# Patient Record
Sex: FEMALE | Race: WHITE | NOT HISPANIC OR LATINO | Employment: FULL TIME | URBAN - METROPOLITAN AREA
[De-identification: names, ages, dates, MRNs, and addresses within clinical notes are randomized per-mention and may not be internally consistent; named-entity substitution may affect disease eponyms.]

---

## 2017-06-08 ENCOUNTER — OFFICE VISIT (OUTPATIENT)
Dept: OBGYN | Facility: CLINIC | Age: 52
End: 2017-06-08
Payer: COMMERCIAL

## 2017-06-08 VITALS
DIASTOLIC BLOOD PRESSURE: 80 MMHG | BODY MASS INDEX: 29.25 KG/M2 | SYSTOLIC BLOOD PRESSURE: 122 MMHG | WEIGHT: 182 LBS | HEIGHT: 66 IN

## 2017-06-08 DIAGNOSIS — Z01.419 ENCOUNTER FOR GYNECOLOGICAL EXAMINATION WITHOUT ABNORMAL FINDING: Primary | ICD-10-CM

## 2017-06-08 DIAGNOSIS — N95.1 SYMPTOMATIC MENOPAUSAL OR FEMALE CLIMACTERIC STATES: ICD-10-CM

## 2017-06-08 PROCEDURE — 99396 PREV VISIT EST AGE 40-64: CPT | Performed by: NURSE PRACTITIONER

## 2017-06-08 PROCEDURE — 87624 HPV HI-RISK TYP POOLED RSLT: CPT | Performed by: NURSE PRACTITIONER

## 2017-06-08 PROCEDURE — G0145 SCR C/V CYTO,THINLAYER,RESCR: HCPCS | Performed by: NURSE PRACTITIONER

## 2017-06-08 RX ORDER — NORETHINDRONE ACETATE AND ETHINYL ESTRADIOL .5; 2.5 MG/1; UG/1
1 TABLET ORAL DAILY
Qty: 84 TABLET | Refills: 3 | Status: SHIPPED | OUTPATIENT
Start: 2017-06-08 | End: 2017-08-29 | Stop reason: ALTCHOICE

## 2017-06-08 ASSESSMENT — PATIENT HEALTH QUESTIONNAIRE - PHQ9: 5. POOR APPETITE OR OVEREATING: NEARLY EVERY DAY

## 2017-06-08 ASSESSMENT — ANXIETY QUESTIONNAIRES
IF YOU CHECKED OFF ANY PROBLEMS ON THIS QUESTIONNAIRE, HOW DIFFICULT HAVE THESE PROBLEMS MADE IT FOR YOU TO DO YOUR WORK, TAKE CARE OF THINGS AT HOME, OR GET ALONG WITH OTHER PEOPLE: SOMEWHAT DIFFICULT
6. BECOMING EASILY ANNOYED OR IRRITABLE: NEARLY EVERY DAY
GAD7 TOTAL SCORE: 12
3. WORRYING TOO MUCH ABOUT DIFFERENT THINGS: NOT AT ALL
1. FEELING NERVOUS, ANXIOUS, OR ON EDGE: NEARLY EVERY DAY
2. NOT BEING ABLE TO STOP OR CONTROL WORRYING: NOT AT ALL
7. FEELING AFRAID AS IF SOMETHING AWFUL MIGHT HAPPEN: NEARLY EVERY DAY
5. BEING SO RESTLESS THAT IT IS HARD TO SIT STILL: NOT AT ALL

## 2017-06-08 NOTE — MR AVS SNAPSHOT
"              After Visit Summary   6/8/2017    Linda Mina    MRN: [id number]           Patient Information     Date Of Birth          1965        Visit Information        Provider Department      6/8/2017 2:00 PM Julieta Moran APRN CNP [facility] [place]        Today's Diagnoses     Encounter for gynecological examination without abnormal finding    -  1    Symptomatic menopausal or female climacteric states           Follow-ups after your visit        Who to contact     If you have questions or need follow up information about today's clinic visit or your schedule please contact [facility] directly at [phone].  Normal or non-critical lab and imaging results will be communicated to you by ABBhart, letter or phone within 4 business days after the clinic has received the results. If you do not hear from us within 7 days, please contact the clinic through ABBhart or phone. If you have a critical or abnormal lab result, we will notify you by phone as soon as possible.  Submit refill requests through Curetis or call your pharmacy and they will forward the refill request to us. Please allow 3 business days for your refill to be completed.          Additional Information About Your Visit        MyChart Information     Curetis gives you secure access to your electronic health record. If you see a primary care provider, you can also send messages to your care team and make appointments. If you have questions, please call your primary care clinic.  If you do not have a primary care provider, please call [phone] and they will assist you.        Care EveryWhere ID     This is your Care EveryWhere ID. This could be used by other organizations to access your [facility] medical records  [id number]        Your Vitals Were     Height BMI (Body Mass Index)                5' 6\" (1.676 m) 29.38 kg/m2           Blood Pressure from Last 3 Encounters:   06/08/17 122/80 "   07/22/16 120/84   10/21/15 125/86    Weight from Last 3 Encounters:   06/08/17 182 lb (82.6 kg)   07/22/16 180 lb (81.6 kg)   10/21/15 200 lb 4.8 oz (90.9 kg)              We Performed the Following     HPV High Risk Types DNA Cervical     Pap imaged thin layer screen with HPV - recommended age 30 - 65          Today's Medication Changes          These changes are accurate as of: 6/8/17  2:40 PM.  If you have any questions, ask your nurse or doctor.               Start taking these medicines.        Dose/Directions    norethindrone-eth estradiol 0.5-2.5 MG-MCG Tabs   Used for:  Symptomatic menopausal or female climacteric states   Started by:  Julieta Moran APRN CNP        Dose:  1 tablet   Take 1 tablet by mouth daily   Quantity:  84 tablet   Refills:  3         Stop taking these medicines if you haven't already. Please contact your care team if you have questions.     docusate sodium 100 MG tablet   Commonly known as:  COLACE   Stopped by:  Julieta Moran APRN CNP           ketoconazole 2 % shampoo   Commonly known as:  NIZORAL   Stopped by:  Julieta Moran APRN CNP                Where to get your medicines      These medications were sent to [id number] - [place], [place] - [address] AT [facility] [place] & [facility] [address]  [address], [place] [address]    Hours:  24-hours Phone:  [phone]     norethindrone-eth estradiol 0.5-2.5 MG-MCG Tabs                Primary Care Provider Office Phone # Fax #    Dano Stuart [phone]       [facility] [address]  [facility] [address]        Thank you!     Thank you for choosing [facility]  for your care. Our goal is always to provide you with excellent care. Hearing back from our patients is one way we can continue to improve our services. Please take a few minutes to complete the written survey that you may receive in the mail after your visit with us. Thank  you!             Your Updated Medication List - Protect others around you: Learn how to safely use, store and throw away your medicines at www.disposemymeds.org.          This list is accurate as of: 6/8/17  2:40 PM.  Always use your most recent med list.                   Brand Name Dispense Instructions for use    ALLEGRA ALLERGY PO      Take 180 mg by mouth       ALPRAZolam 0.5 MG tablet    XANAX     Take 0.5 mg by mouth daily       fluconazole 150 MG tablet    DIFLUCAN    2 tablet    Take 1 tablet (150 mg) by mouth every 3 days       LEVOTHYROXINE SODIUM PO      Take 112 mcg by mouth daily       LUTEIN PO      Take 1 tablet by mouth daily       mometasone 50 MCG/ACT spray    NASONEX     Spray 2 sprays into both nostrils daily       Multi-vitamin Tabs tablet      Take 1 tablet by mouth daily       norethindrone-eth estradiol 0.5-2.5 MG-MCG Tabs     84 tablet    Take 1 tablet by mouth daily       * PATADAY 0.2 % Soln   Generic drug:  olopatadine HCl      1 drop as needed       * PAZEO 0.7 % Soln   Generic drug:  Olopatadine HCl      1 drop as needed       VITAMIN D3 PO      Take 2,000 Units by mouth daily       VITAMIN E NATURAL PO      Take by mouth daily       * Notice:  This list has 2 medication(s) that are the same as other medications prescribed for you. Read the directions carefully, and ask your doctor or other care provider to review them with you.

## 2017-06-08 NOTE — PROGRESS NOTES
Linda is a 51 year old No obstetric history on file. female who presents for annual exam.     Besides routine health maintenance,  she would like to discuss hot flashes.    HPI: annual exam discuss menopausal symptons.  Having hot flashes, night sweats, not sleeping, forgetful at times, and mood swings. Ablation 5 years ago, no cycle since.  The patient's PCP is  Dano Stuart MD.        GYNECOLOGIC HISTORY:    No LMP recorded. Patient has had an ablation.  Her current contraception method is: ablation.  She  reports that she quit smoking about 15 years ago. Her smoking use included Cigarettes. She has never used smokeless tobacco.    Patient is sexually active.  STD testing offered?  Declined  Last PHQ-9 score on record =   PHQ-9 SCORE 6/8/2017   Total Score 8     Last GAD7 score on record =   MARIANA-7 SCORE 6/8/2017   Total Score 12     Alcohol Score = 3    HEALTH MAINTENANCE:  Cholesterol: checked at work and PCP  Last Mammo: 2016 normal at [facility], Next Mammo: 2017  Pap: 7/22/16, Nil  Lab Results   Component Value Date    PAP NIL 07/22/2016    Colonoscopy:  8/17/15, Result: polyps, tubular adenomas, Next Colonoscopy: 2020.  Dexa:  7/22/16 T 0.9    Health maintenance updated:  yes    HISTORY:  Obstetric History     No data available          Patient Active Problem List   Diagnosis     Status post bilateral knee replacements     Past Surgical History:   Procedure Laterality Date     ARTHROPLASTY KNEE UNICOMPARTMENT  11/11/2013    Procedure: ARTHROPLASTY KNEE UNICOMPARTMENT;  BILATERAL UNICOMPARTMENTAL  KNEE ARTHROPLASTY (NANDA)^ ;  Surgeon: Jimbo Brown MD;  Location:  OR     ARTHROSCOPY SHOULDER DECOMPRESSION      right     BUNIONECTOMY       CARPAL TUNNEL RELEASE RT/LT       COLONOSCOPY N/A 8/17/2015    Procedure: COMBINED COLONOSCOPY, SINGLE OR MULTIPLE BIOPSY/POLYPECTOMY BY BIOPSY;  Surgeon: Dinorah Odom MD;  Location:  GI     LAPAROSCOPIC APPENDECTOMY N/A 10/21/2015    Procedure:  "LAPAROSCOPIC APPENDECTOMY;  Surgeon: Dinorah Odom MD;  Location: [facility]     MAMMOPLASTY AUGMENTATION        Social History   Substance Use Topics     Smoking status: Former Smoker     Types: Cigarettes     Quit date: 11/9/2001     Smokeless tobacco: Never Used     Alcohol use Yes      Comment: 2 glasses of wine 3 times a week      Problem (# of Occurrences) Relation (Name,Age of Onset)    Other Cancer (1) Father            Current Outpatient Prescriptions   Medication Sig     norethindrone-eth estradiol 0.5-2.5 MG-MCG TABS Take 1 tablet by mouth daily     Olopatadine HCl (PAZEO) 0.7 % SOLN 1 drop as needed     LUTEIN PO Take 1 tablet by mouth daily     VITAMIN E NATURAL PO Take by mouth daily      Cholecalciferol (VITAMIN D3 PO) Take 2,000 Units by mouth daily      multivitamin, therapeutic with minerals (MULTI-VITAMIN) TABS Take 1 tablet by mouth daily     mometasone (NASONEX) 50 MCG/ACT nasal spray Spray 2 sprays into both nostrils daily     LEVOTHYROXINE SODIUM PO Take 112 mcg by mouth daily     Fexofenadine HCl (ALLEGRA ALLERGY PO) Take 180 mg by mouth     ALPRAZolam (XANAX) 0.5 MG tablet Take 0.5 mg by mouth daily     olopatadine HCl (PATADAY) 0.2 % SOLN 1 drop as needed     fluconazole (DIFLUCAN) 150 MG tablet Take 1 tablet (150 mg) by mouth every 3 days (Patient not taking: Reported on 6/8/2017)     No current facility-administered medications for this visit.      No Known Allergies    Past medical, surgical, social and family histories were reviewed and updated in EPIC.    ROS:   12 point review of systems negative other than symptoms noted below.  Genitourinary: Hot Flashes  Endocrine: Decreased Libido  Psychiatric: Anxiety    EXAM:  /80  Ht 5' 6\" (1.676 m)  Wt 182 lb (82.6 kg)  BMI 29.38 kg/m2   BMI: Body mass index is 29.38 kg/(m^2).    PHYSICAL EXAM:  Constitutional:  Appearance: Well nourished, well developed, alert, in no acute distress  Neck:  Lymph Nodes:  No lymphadenopathy " present    Thyroid:  Gland size normal, nontender, no nodules or masses present  on palpation  Chest:  Respiratory Effort:  Breathing unlabored  Cardiovascular:    Heart: Auscultation:  Regular rate, normal rhythm, no murmurs present  Breasts: Inspection of Breasts:  No lymphadenopathy present    Palpation of Breasts and Axillae:  No masses present on palpation, no  breast tenderness    Axillary Lymph Nodes:  No lymphadenopathy present  Gastrointestinal:   Abdominal Examination:  Abdomen nontender to palpation, tone normal without rigidity or guarding, no masses present, umbilicus without lesions   Liver and Spleen:  No hepatomegaly present, liver nontender to palpation    Hernias:  No hernias present  Lymphatic: Lymph Nodes:  No other lymphadenopathy present  Skin:  General Inspection:  No rashes present, no lesions present, no areas of  discoloration    Genitalia and Groin:  No rashes present, no lesions present, no areas of  discoloration, no masses present  Neurologic/Psychiatric:    Mental Status:  Oriented X3     Pelvic Exam:  External Genitalia:     Normal appearance for age, no discharge present, no tenderness present, no inflammatory lesions present, color normal  Vagina:     Normal vaginal vault without central or paravaginal defects, no discharge present, no inflammatory lesions present, no masses present  Bladder:     Nontender to palpation  Urethra:   Urethral Body:  Urethra palpation normal, urethra structural support normal   Urethral Meatus:  No erythema or lesions present  Cervix:     Appearance healthy, no lesions present, nontender to palpation, no bleeding present  Uterus:     Uterus: firm, normal sized and nontender, midplane in position.   Adnexa:     No adnexal tenderness present, no adnexal masses present  Perineum:     Perineum within normal limits, no evidence of trauma, no rashes or skin lesions present  Anus:     Anus within normal limits, no hemorrhoids present  Inguinal Lymph Nodes:      No lymphadenopathy present  Pubic Hair:     Normal pubic hair distribution for age  Genitalia and Groin:     No rashes present, no lesions present, no areas of discoloration, no masses present    COUNSELING:   Reviewed preventive health counseling, as reflected in patient instructions       Regular exercise       Vision screening       Colon cancer screening       (Kimmie)menopause management    BMI: Body mass index is 29.38 kg/(m^2).      ASSESSMENT:  51 year old female with satisfactory annual exam.    ICD-10-CM    1. Encounter for gynecological examination without abnormal finding Z01.419 Pap imaged thin layer screen with HPV - recommended age 30 - 65     HPV High Risk Types DNA Cervical   2. Symptomatic menopausal or female climacteric states N95.1 norethindrone-eth estradiol 0.5-2.5 MG-MCG TABS       PLAN:  Normal Gyn exam.  Discussed risks methods and benefits of HRT and patient would like to try.  Will start femhrt and call with update.  Return prn or 1 year for annual and pap.    CARLOS Escalante CNP

## 2017-06-09 ASSESSMENT — PATIENT HEALTH QUESTIONNAIRE - PHQ9: SUM OF ALL RESPONSES TO PHQ QUESTIONS 1-9: 8

## 2017-06-09 ASSESSMENT — ANXIETY QUESTIONNAIRES: GAD7 TOTAL SCORE: 12

## 2017-06-12 LAB
COPATH REPORT: NORMAL
PAP: NORMAL

## 2017-06-13 LAB
FINAL DIAGNOSIS: NORMAL
HPV HR 12 DNA CVX QL NAA+PROBE: NEGATIVE
HPV16 DNA SPEC QL NAA+PROBE: NEGATIVE
HPV18 DNA SPEC QL NAA+PROBE: NEGATIVE
SPECIMEN DESCRIPTION: NORMAL

## 2017-08-18 ENCOUNTER — TELEPHONE (OUTPATIENT)
Dept: OBGYN | Facility: CLINIC | Age: 52
End: 2017-08-18

## 2017-08-18 NOTE — TELEPHONE ENCOUNTER
rec'd notice that her jevantique lo brand is not covered and cannot get generic in stock. I called patient and she had recently picked up her prescription as she had them special order it.     Bassam wants you to know the HRT has helped her symptoms tremendously-she is sleeping and her hot flashes are almost gone. She is getting a HA pretty much everyday. She said it is worth it to stay on the HRT, but if you had any other ideas to reach out to her.

## 2017-08-22 NOTE — TELEPHONE ENCOUNTER
Patient is doing well as far as menopausal sympton relief on femhrt, but getting headaches everyday.  They are not severe but occasionally takes advil for one.  Discussed she takes it in the evening with other pills, so going to try to  Take in AM alone and see if makes a difference.  Denies any visual problems with headaches.  If headaches persist after changing to am, will need to switch medication.  Patient will call with blaire.  GAIL Coppola

## 2017-08-29 DIAGNOSIS — N95.1 SYMPTOMATIC MENOPAUSAL OR FEMALE CLIMACTERIC STATES: Primary | ICD-10-CM

## 2017-08-29 RX ORDER — MEDROXYPROGESTERONE ACETATE 2.5 MG/1
2.5 TABLET ORAL DAILY
Qty: 30 TABLET | Refills: 3 | Status: SHIPPED | OUTPATIENT
Start: 2017-08-29 | End: 2017-12-24

## 2017-08-29 RX ORDER — ESTRADIOL 0.5 MG/1
0.5 TABLET ORAL DAILY
Qty: 30 TABLET | Refills: 3 | Status: SHIPPED | OUTPATIENT
Start: 2017-08-29 | End: 2017-12-24

## 2017-09-29 ENCOUNTER — TELEPHONE (OUTPATIENT)
Dept: NURSING | Facility: CLINIC | Age: 52
End: 2017-09-29

## 2017-09-29 NOTE — TELEPHONE ENCOUNTER
Pt requesting to speak with Roz Moran or Dr. Edward, both out of office. Pt calling stating she was expecting a call back from Roz Moran regarding her HRT medications. She was told to cut her pills in half which pt has done but now pt is out of pills and has a headache. Pt stated she would refill Rx's and take again over the weekend, pt would like a call from Roz Moran on Monday to discuss plan of care and HRT medications and how she should be taking them. Informed I would send a note to Roz Moran requesting she call pt Monday morning. Note routed to Roz Moran to review and advise.

## 2017-10-02 NOTE — TELEPHONE ENCOUNTER
Patient was on generic femhrt first and had headaches everyday.  Tried changing time of taking them and decreasing dose did not seem to make a difference.  Denied never any visual problems or speech changes.  She does have a history of migraines, been a long time since she has seen anyone for them.  We changed to estradiol and provera and headaches are still there, but less painful.  She also states she has a high stress job and right now is ecspecially bad.  Recommended stopping HRT and patient did try  To stop but the menopausal symptons are too severe, will not do that.  Discussed with DR. Edward and patient to go have PCP evaluate headaches.  ALESSANDRA CoppoalC

## 2018-01-20 ENCOUNTER — TELEPHONE (OUTPATIENT)
Dept: OBGYN | Facility: CLINIC | Age: 53
End: 2018-01-20

## 2018-01-20 DIAGNOSIS — N95.1 SYMPTOMATIC MENOPAUSAL OR FEMALE CLIMACTERIC STATES: ICD-10-CM

## 2018-01-22 RX ORDER — MEDROXYPROGESTERONE ACETATE 2.5 MG/1
TABLET ORAL
Qty: 30 TABLET | Refills: 0 | Status: SHIPPED | OUTPATIENT
Start: 2018-01-22 | End: 2018-02-22

## 2018-01-22 RX ORDER — ESTRADIOL 0.5 MG/1
TABLET ORAL
Qty: 30 TABLET | Refills: 0 | Status: SHIPPED | OUTPATIENT
Start: 2018-01-22 | End: 2018-02-22

## 2018-01-22 NOTE — TELEPHONE ENCOUNTER
Estradiol (Estrace) 0.5 mg tablet-take 1 tab PO daily       Last Written Prescription Date:  12/27/17  Last Fill Quantity: 30 tabs,   # refills: 0  Last Office Visit with [facility] primary care provider:  6/8/17-Annual with JUAN Courtney  Future Office visit: None    Medroxyprogesterone (Provera) 2.5 mg-take 1 tab PO daily      Last Written Prescription Date:  12/27/17  Last Fill Quantity: 30 tabs,   # refills: 0  Last Office Visit with [facility] primary care provider:  6/8/17-Annual with JUAN Courtney      Routing to JUAN Courtney to review and advise refill request. Dr. Edward approved refilling Rx but only filled it for 30 tabs/0rf (see telephone encounter from 12/24/17).

## 2018-02-22 DIAGNOSIS — N95.1 SYMPTOMATIC MENOPAUSAL OR FEMALE CLIMACTERIC STATES: ICD-10-CM

## 2018-02-22 RX ORDER — ESTRADIOL 0.5 MG/1
TABLET ORAL
Qty: 90 TABLET | Refills: 0 | Status: SHIPPED | OUTPATIENT
Start: 2018-02-22 | End: 2018-05-20

## 2018-02-22 RX ORDER — MEDROXYPROGESTERONE ACETATE 2.5 MG/1
TABLET ORAL
Qty: 90 TABLET | Refills: 0 | Status: SHIPPED | OUTPATIENT
Start: 2018-02-22 | End: 2018-05-20

## 2018-02-22 NOTE — TELEPHONE ENCOUNTER
ESTRADIOL 0.5MG      Last Written Prescription Date:  1/22/18  Last Fill Quantity: 30,   # refills: 0  Last Office Visit: 6/8/17  Future Office visit:   NONE    PROGESTERONE      Last Written Prescription Date:  1/22/18  Last Fill Quantity: 30,   # refills: 0  Last Office Visit: 6/8/17  Future Office visit:   NONE    Routing refill request to provider for review/approval because:  Rx only being filled for one month supply at a time- ok to fill until annual due time? See refill encounter 12/24/17.

## 2018-05-20 DIAGNOSIS — N95.1 SYMPTOMATIC MENOPAUSAL OR FEMALE CLIMACTERIC STATES: ICD-10-CM

## 2018-05-21 RX ORDER — MEDROXYPROGESTERONE ACETATE 2.5 MG/1
TABLET ORAL
Qty: 90 TABLET | Refills: 0 | Status: SHIPPED | OUTPATIENT
Start: 2018-05-21 | End: 2018-06-13

## 2018-05-21 RX ORDER — ESTRADIOL 0.5 MG/1
TABLET ORAL
Qty: 90 TABLET | Refills: 0 | Status: SHIPPED | OUTPATIENT
Start: 2018-05-21 | End: 2018-06-13

## 2018-06-13 ENCOUNTER — OFFICE VISIT (OUTPATIENT)
Dept: OBGYN | Facility: CLINIC | Age: 53
End: 2018-06-13
Payer: COMMERCIAL

## 2018-06-13 VITALS
DIASTOLIC BLOOD PRESSURE: 70 MMHG | HEART RATE: 78 BPM | SYSTOLIC BLOOD PRESSURE: 110 MMHG | WEIGHT: 196.6 LBS | HEIGHT: 66 IN | BODY MASS INDEX: 31.6 KG/M2

## 2018-06-13 DIAGNOSIS — N95.1 SYMPTOMATIC MENOPAUSAL OR FEMALE CLIMACTERIC STATES: ICD-10-CM

## 2018-06-13 DIAGNOSIS — B37.31 YEAST INFECTION OF THE VAGINA: ICD-10-CM

## 2018-06-13 DIAGNOSIS — Z01.419 ENCOUNTER FOR GYNECOLOGICAL EXAMINATION WITHOUT ABNORMAL FINDING: Primary | ICD-10-CM

## 2018-06-13 PROCEDURE — G0145 SCR C/V CYTO,THINLAYER,RESCR: HCPCS | Performed by: NURSE PRACTITIONER

## 2018-06-13 PROCEDURE — 87624 HPV HI-RISK TYP POOLED RSLT: CPT | Performed by: NURSE PRACTITIONER

## 2018-06-13 PROCEDURE — 99396 PREV VISIT EST AGE 40-64: CPT | Performed by: NURSE PRACTITIONER

## 2018-06-13 RX ORDER — ESTRADIOL 0.5 MG/1
0.5 TABLET ORAL DAILY
Qty: 90 TABLET | Refills: 3 | Status: SHIPPED | OUTPATIENT
Start: 2018-06-13 | End: 2019-08-18

## 2018-06-13 RX ORDER — MEDROXYPROGESTERONE ACETATE 2.5 MG/1
2.5 TABLET ORAL DAILY
Qty: 90 TABLET | Refills: 3 | Status: SHIPPED | OUTPATIENT
Start: 2018-06-13 | End: 2019-08-18

## 2018-06-13 RX ORDER — FLUCONAZOLE 150 MG/1
150 TABLET ORAL
Qty: 2 TABLET | Refills: 3 | Status: SHIPPED | OUTPATIENT
Start: 2018-06-13 | End: 2019-09-03

## 2018-06-13 ASSESSMENT — ANXIETY QUESTIONNAIRES
7. FEELING AFRAID AS IF SOMETHING AWFUL MIGHT HAPPEN: NOT AT ALL
2. NOT BEING ABLE TO STOP OR CONTROL WORRYING: NOT AT ALL
GAD7 TOTAL SCORE: 0
1. FEELING NERVOUS, ANXIOUS, OR ON EDGE: NOT AT ALL
5. BEING SO RESTLESS THAT IT IS HARD TO SIT STILL: NOT AT ALL
6. BECOMING EASILY ANNOYED OR IRRITABLE: NOT AT ALL
3. WORRYING TOO MUCH ABOUT DIFFERENT THINGS: NOT AT ALL

## 2018-06-13 ASSESSMENT — PATIENT HEALTH QUESTIONNAIRE - PHQ9: 5. POOR APPETITE OR OVEREATING: NOT AT ALL

## 2018-06-13 NOTE — PROGRESS NOTES
Linda is a 52 year old No obstetric history on file. female who presents for annual exam.     Besides routine health maintenance, she has no other health concerns today.    HPI: here for annual exam, no concerns today.  On HRT and doing well.  Does mammogram at [facility].  Blood work done with PCP.  Oldest son graduated this  Year and going to [employer]    The patient's PCP is Dano Stuart MD.        GYNECOLOGIC HISTORY:    No LMP recorded. Patient has had an ablation.  Her current contraception method is: tubal ligation.  She  reports that she quit smoking about 16 years ago. Her smoking use included Cigarettes. She has never used smokeless tobacco.    Patient is sexually active.  STD testing offered?  Declined  Last PHQ-9 score on record =   PHQ-9 SCORE 6/13/2018   Total Score 7     Last GAD7 score on record =   MARIANA-7 SCORE 6/13/2018   Total Score 0     Alcohol Score = 3    HEALTH MAINTENANCE:  Cholesterol: (No results found for: CHOL -patient has labs done with pcp  Last Mammo: 10/17/17, Result: normal, Next Mammo: Oct 2017  Pap: 6/8/17 neg, HPV-  Colonoscopy:  8/17/15, Result: normal, Next Colonoscopy: 2 years.  Dexa: 7/22/16    Health maintenance updated:  yes    HISTORY:  Obstetric History     No data available          Patient Active Problem List   Diagnosis     Status post bilateral knee replacements     Past Surgical History:   Procedure Laterality Date     ARTHROPLASTY KNEE UNICOMPARTMENT  11/11/2013    Procedure: ARTHROPLASTY KNEE UNICOMPARTMENT;  BILATERAL UNICOMPARTMENTAL  KNEE ARTHROPLASTY (NANDA)^ ;  Surgeon: Jimbo Brown MD;  Location:  OR     ARTHROSCOPY SHOULDER DECOMPRESSION      right     BUNIONECTOMY       CARPAL TUNNEL RELEASE RT/LT       COLONOSCOPY N/A 8/17/2015    Procedure: COMBINED COLONOSCOPY, SINGLE OR MULTIPLE BIOPSY/POLYPECTOMY BY BIOPSY;  Surgeon: Dinorah Odom MD;  Location:  GI     LAPAROSCOPIC APPENDECTOMY N/A 10/21/2015    Procedure: LAPAROSCOPIC  "APPENDECTOMY;  Surgeon: Dinorah Odom MD;  Location: [facility]     MAMMOPLASTY AUGMENTATION        Social History   Substance Use Topics     Smoking status: Former Smoker     Types: Cigarettes     Quit date: 11/9/2001     Smokeless tobacco: Never Used     Alcohol use Yes      Comment: 2 glasses of wine 3 times a week      Problem (# of Occurrences) Relation (Name,Age of Onset)    Other Cancer (1) Father            Current Outpatient Prescriptions   Medication Sig     ALPRAZolam (XANAX) 0.5 MG tablet Take 0.5 mg by mouth daily     Cholecalciferol (VITAMIN D3 PO) Take 2,000 Units by mouth daily      estradiol (ESTRACE) 0.5 MG tablet Take 1 tablet (0.5 mg) by mouth daily     Fexofenadine HCl (ALLEGRA ALLERGY PO) Take 180 mg by mouth     fluconazole (DIFLUCAN) 150 MG tablet Take 1 tablet (150 mg) by mouth every 3 days     LEVOTHYROXINE SODIUM PO Take 112 mcg by mouth daily     LUTEIN PO Take 1 tablet by mouth daily     medroxyPROGESTERone (PROVERA) 2.5 MG tablet Take 1 tablet (2.5 mg) by mouth daily     mometasone (NASONEX) 50 MCG/ACT nasal spray Spray 2 sprays into both nostrils daily     multivitamin, therapeutic with minerals (MULTI-VITAMIN) TABS Take 1 tablet by mouth daily     olopatadine HCl (PATADAY) 0.2 % SOLN 1 drop as needed     Olopatadine HCl (PAZEO) 0.7 % SOLN 1 drop as needed     VITAMIN E NATURAL PO Take by mouth daily      [DISCONTINUED] estradiol (ESTRACE) 0.5 MG tablet TAKE 1 TABLET(0.5 MG) BY MOUTH DAILY     No current facility-administered medications for this visit.      No Known Allergies    Past medical, surgical, social and family histories were reviewed and updated in EPIC.    ROS:   12 point review of systems negative other than symptoms noted below.    EXAM:  /70  Pulse 78  Ht 5' 6\" (1.676 m)  Wt 196 lb 9.6 oz (89.2 kg)  BMI 31.73 kg/m2   BMI: Body mass index is 31.73 kg/(m^2).    PHYSICAL EXAM:  Constitutional:  Appearance: Well nourished, well developed, alert, in no acute " distress  Neck:  Lymph Nodes:  No lymphadenopathy present    Thyroid:  Gland size normal, nontender, no nodules or masses present  on palpation  Chest:  Respiratory Effort:  Breathing unlabored  Cardiovascular:    Heart: Auscultation:  Regular rate, normal rhythm, no murmurs present  Breasts: Inspection of Breasts:  No lymphadenopathy present., Palpation of Breasts and Axillae:  No masses present on palpation, no breast tenderness., Axillary Lymph Nodes:  No lymphadenopathy present. and No nodularity, asymmetry or nipple discharge bilaterally.  Gastrointestinal:   Abdominal Examination:  Abdomen nontender to palpation, tone normal without rigidity or guarding, no masses present, umbilicus without lesions   Liver and Spleen:  No hepatomegaly present, liver nontender to palpation    Hernias:  No hernias present  Lymphatic: Lymph Nodes:  No other lymphadenopathy present  Skin:  General Inspection:  No rashes present, no lesions present, no areas of  discoloration    Genitalia and Groin:  No rashes present, no lesions present, no areas of  discoloration, no masses present  Neurologic/Psychiatric:    Mental Status:  Oriented X3     Pelvic Exam:  External Genitalia:     Normal appearance for age, no discharge present, no tenderness present, no inflammatory lesions present, color normal  Vagina:     Normal vaginal vault without central or paravaginal defects, no discharge present, no inflammatory lesions present, no masses present  Bladder:     Nontender to palpation  Urethra:   Urethral Body:  Urethra palpation normal, urethra structural support normal   Urethral Meatus:  No erythema or lesions present  Cervix:     Appearance healthy, no lesions present, nontender to palpation, no bleeding present  Uterus:     Uterus: firm, normal sized and nontender, midplane in position.   Adnexa:     No adnexal tenderness present, no adnexal masses present  Perineum:     Perineum within normal limits, no evidence of trauma, no rashes  or skin lesions present  Anus:     Anus within normal limits, no hemorrhoids present  Inguinal Lymph Nodes:     No lymphadenopathy present  Pubic Hair:     Normal pubic hair distribution for age  Genitalia and Groin:     No rashes present, no lesions present, no areas of discoloration, no masses present      COUNSELING:   Reviewed preventive health counseling, as reflected in patient instructions       Regular exercise       Healthy diet/nutrition       Osteoporosis Prevention/Bone Health       (Kimmie)menopause management    BMI: Body mass index is 31.73 kg/(m^2).  Weight management plan: Discussed healthy diet and exercise guidelines and patient will follow up in 12 months in clinic to re-evaluate.    ASSESSMENT:  52 year old female with satisfactory annual exam.    ICD-10-CM    1. Encounter for gynecological examination without abnormal finding Z01.419 Pap imaged thin layer screen with HPV - recommended age 30 - 65     HPV High Risk Types DNA Cervical   2. Symptomatic menopausal or female climacteric states N95.1 estradiol (ESTRACE) 0.5 MG tablet     medroxyPROGESTERone (PROVERA) 2.5 MG tablet   3. Yeast infection of the vagina B37.3 fluconazole (DIFLUCAN) 150 MG tablet       PLAN:  Normal Gyn  Exam.  Ok to continue HRT for 1 year.  Return prn or 1 year for annual and pap smear.    Julieta Moran, CARLOS CNP

## 2018-06-13 NOTE — MR AVS SNAPSHOT
"              After Visit Summary   6/13/2018    Linda Mina    MRN: [id number]           Patient Information     Date Of Birth          1965        Visit Information        Provider Department      6/13/2018 2:30 PM Julieta Moran APRN CNP [facility]        Today's Diagnoses     Encounter for gynecological examination without abnormal finding    -  1    Symptomatic menopausal or female climacteric states        Yeast infection of the vagina           Follow-ups after your visit        Who to contact     If you have questions or need follow up information about today's clinic visit or your schedule please contact [facility] directly at [phone].  Normal or non-critical lab and imaging results will be communicated to you by MyChart, letter or phone within 4 business days after the clinic has received the results. If you do not hear from us within 7 days, please contact the clinic through [facility] or phone. If you have a critical or abnormal lab result, we will notify you by phone as soon as possible.  Submit refill requests through [employer] or call your pharmacy and they will forward the refill request to us. Please allow 3 business days for your refill to be completed.          Additional Information About Your Visit        MyChart Information     [facility] gives you secure access to your electronic health record. If you see a primary care provider, you can also send messages to your care team and make appointments. If you have questions, please call your primary care clinic.  If you do not have a primary care provider, please call [phone] and they will assist you.        Care EveryWhere ID     This is your Care EveryWhere ID. This could be used by other organizations to access your [place] medical records  [id number]        Your Vitals Were     Pulse Height BMI (Body Mass Index)             78 5' 6\" (1.676 m) 31.73 kg/m2          Blood Pressure " from Last 3 Encounters:   06/13/18 110/70   06/08/17 122/80   07/22/16 120/84    Weight from Last 3 Encounters:   06/13/18 196 lb 9.6 oz (89.2 kg)   06/08/17 182 lb (82.6 kg)   07/22/16 180 lb (81.6 kg)              We Performed the Following     HPV High Risk Types DNA Cervical     Pap imaged thin layer screen with HPV - recommended age 30 - 65          Today's Medication Changes          These changes are accurate as of 6/13/18  2:50 PM.  If you have any questions, ask your nurse or doctor.               These medicines have changed or have updated prescriptions.        Dose/Directions    estradiol 0.5 MG tablet   Commonly known as:  ESTRACE   This may have changed:  See the new instructions.   Used for:  Symptomatic menopausal or female climacteric states   Changed by:  Julieta Moran APRN CNP        Dose:  0.5 mg   Take 1 tablet (0.5 mg) by mouth daily   Quantity:  90 tablet   Refills:  3       medroxyPROGESTERone 2.5 MG tablet   Commonly known as:  PROVERA   This may have changed:  See the new instructions.   Used for:  Symptomatic menopausal or female climacteric states   Changed by:  Julieta Moran APRN CNP        Dose:  2.5 mg   Take 1 tablet (2.5 mg) by mouth daily   Quantity:  90 tablet   Refills:  3            Where to get your medicines      These medications were sent to [id number] - [place], [place] - [address] AT [facility] [place] [facility] Pending sale to [facility] [address]  [address], [place] [address]     Phone:  [phone]     estradiol 0.5 MG tablet    fluconazole 150 MG tablet    medroxyPROGESTERone 2.5 MG tablet                Primary Care Provider Office Phone # Fax #    Dano Stuart [phone]       [facility] [address]  [facility] [address]        Equal Access to Services     IVANA WELDON AH: Vidal Goetz, waviviana luqadaha, qaybta kaalmayadira may, venessa cai. So [facility]  [phone].    ATENCIÓN: Si tamiko britt, tiene a webb disposición servicios gratuitos de asistencia lingüística. Anamaria raza [phone].    We comply with applicable federal civil rights laws and [place] laws. We do not discriminate on the basis of race, color, national origin, age, disability, sex, sexual orientation, or gender identity.            Thank you!     Thank you for choosing [facility]  for your care. Our goal is always to provide you with excellent care. Hearing back from our patients is one way we can continue to improve our services. Please take a few minutes to complete the written survey that you may receive in the mail after your visit with us. Thank you!             Your Updated Medication List - Protect others around you: Learn how to safely use, store and throw away your medicines at www.disposemymeds.org.          This list is accurate as of 6/13/18  2:50 PM.  Always use your most recent med list.                   Brand Name Dispense Instructions for use Diagnosis    ALLEGRA ALLERGY PO      Take 180 mg by mouth        ALPRAZolam 0.5 MG tablet    XANAX     Take 0.5 mg by mouth daily        estradiol 0.5 MG tablet    ESTRACE    90 tablet    Take 1 tablet (0.5 mg) by mouth daily    Symptomatic menopausal or female climacteric states       fluconazole 150 MG tablet    DIFLUCAN    2 tablet    Take 1 tablet (150 mg) by mouth every 3 days    Yeast infection of the vagina       LEVOTHYROXINE SODIUM PO      Take 112 mcg by mouth daily        LUTEIN PO      Take 1 tablet by mouth daily        medroxyPROGESTERone 2.5 MG tablet    PROVERA    90 tablet    Take 1 tablet (2.5 mg) by mouth daily    Symptomatic menopausal or female climacteric states       mometasone 50 MCG/ACT spray    NASONEX     Spray 2 sprays into both nostrils daily        Multi-vitamin Tabs tablet      Take 1 tablet by mouth daily        * PATADAY 0.2 % Soln   Generic drug:  olopatadine HCl      1 drop as needed         * PAZEO 0.7 % Soln   Generic drug:  Olopatadine HCl      1 drop as needed        VITAMIN D3 PO      Take 2,000 Units by mouth daily        VITAMIN E NATURAL PO      Take by mouth daily        * Notice:  This list has 2 medication(s) that are the same as other medications prescribed for you. Read the directions carefully, and ask your doctor or other care provider to review them with you.

## 2018-06-13 NOTE — LETTER
June 22, 2018    Linda Mina  [address] [place] [address]    Dear Linda,  We are happy to inform you that your PAP smear result from 6/13/18 is normal.  We are now able to do a follow up test on PAP smears. The DNA test is for HPV (Human Papilloma Virus). Cervical cancer is closely linked with certain types of HPV. Your results showed no evidence of high risk HPV.  Therefore we recommend you return in 1 year for your next pap smear.  You will still need to return to the clinic every year for an annual exam and other preventive tests.  Please contact the clinic at [phone] with any questions.  Sincerely,    CARLOS Escalante CNP/rlm

## 2018-06-14 ASSESSMENT — PATIENT HEALTH QUESTIONNAIRE - PHQ9: SUM OF ALL RESPONSES TO PHQ QUESTIONS 1-9: 7

## 2018-06-14 ASSESSMENT — ANXIETY QUESTIONNAIRES: GAD7 TOTAL SCORE: 0

## 2018-06-15 LAB
COPATH REPORT: NORMAL
PAP: NORMAL

## 2018-06-17 ENCOUNTER — HEALTH MAINTENANCE LETTER (OUTPATIENT)
Age: 53
End: 2018-06-17

## 2018-06-19 LAB
FINAL DIAGNOSIS: NORMAL
HPV HR 12 DNA CVX QL NAA+PROBE: NEGATIVE
HPV16 DNA SPEC QL NAA+PROBE: NEGATIVE
HPV18 DNA SPEC QL NAA+PROBE: NEGATIVE
SPECIMEN DESCRIPTION: NORMAL
SPECIMEN SOURCE CVX/VAG CYTO: NORMAL

## 2018-09-18 ENCOUNTER — TRANSFERRED RECORDS (OUTPATIENT)
Dept: HEALTH INFORMATION MANAGEMENT | Facility: CLINIC | Age: 53
End: 2018-09-18

## 2019-04-12 NOTE — PROGRESS NOTES
Please abstract the following data from this visit with this patient into the appropriate field in Epic:    Mammogram done on this date: 09/18/18 (approximately), by this group: [facility], results were normal. Report on Care everywhere

## 2019-08-18 DIAGNOSIS — N95.1 SYMPTOMATIC MENOPAUSAL OR FEMALE CLIMACTERIC STATES: ICD-10-CM

## 2019-08-19 RX ORDER — ESTRADIOL 0.5 MG/1
TABLET ORAL
Qty: 30 TABLET | Refills: 0 | Status: SHIPPED | OUTPATIENT
Start: 2019-08-19 | End: 2019-09-03

## 2019-08-19 RX ORDER — MEDROXYPROGESTERONE ACETATE 2.5 MG/1
TABLET ORAL
Qty: 30 TABLET | Refills: 0 | Status: SHIPPED | OUTPATIENT
Start: 2019-08-19 | End: 2019-09-03

## 2019-08-19 NOTE — TELEPHONE ENCOUNTER
"Requested Prescriptions   Pending Prescriptions Disp Refills     estradiol (ESTRACE) 0.5 MG tablet [Pharmacy Med Name: ESTRADIOL 0.5MG TABLETS] 90 tablet 0     Sig: TAKE 1 TABLET(0.5 MG) BY MOUTH DAILY       Hormone Replacement Therapy Failed - 8/18/2019  9:12 AM        Failed - Blood pressure under 140/90 in past 12 months     BP Readings from Last 3 Encounters:   06/13/18 110/70   06/08/17 122/80   07/22/16 120/84                 Failed - Recent (12 mo) or future (30 days) visit within the authorizing provider's specialty     Patient had office visit in the last 12 months or has a visit in the next 30 days with authorizing provider or within the authorizing provider's specialty.  See \"Patient Info\" tab in inbasket, or \"Choose Columns\" in Meds & Orders section of the refill encounter.              Passed - Patient has mammogram in past 2 years on file if age 50-75        Passed - Medication is active on med list        Passed - Patient is 18 years of age or older        Passed - No active pregnancy on record        Passed - No positive pregnancy test on record in past 12 months        medroxyPROGESTERone (PROVERA) 2.5 MG tablet [Pharmacy Med Name: MEDROXYPROGESTERONE 2.5MG TABLETS] 90 tablet 0     Sig: TAKE 1 TABLET(2.5 MG) BY MOUTH DAILY       There is no refill protocol information for this order        Medication is being filled for 1 time refill only due to:  appointment needed   Debbi Castellon RN on 8/19/2019 at 8:20 AM      "
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
## 2019-09-03 ENCOUNTER — OFFICE VISIT (OUTPATIENT)
Dept: OBGYN | Facility: CLINIC | Age: 54
End: 2019-09-03
Payer: COMMERCIAL

## 2019-09-03 VITALS
SYSTOLIC BLOOD PRESSURE: 116 MMHG | HEART RATE: 72 BPM | HEIGHT: 66 IN | DIASTOLIC BLOOD PRESSURE: 68 MMHG | BODY MASS INDEX: 34.39 KG/M2 | WEIGHT: 214 LBS

## 2019-09-03 DIAGNOSIS — N95.0 PMB (POSTMENOPAUSAL BLEEDING): ICD-10-CM

## 2019-09-03 DIAGNOSIS — Z01.419 ENCOUNTER FOR GYNECOLOGICAL EXAMINATION WITHOUT ABNORMAL FINDING: Primary | ICD-10-CM

## 2019-09-03 DIAGNOSIS — B37.31 YEAST INFECTION OF THE VAGINA: ICD-10-CM

## 2019-09-03 DIAGNOSIS — N95.1 SYMPTOMATIC MENOPAUSAL OR FEMALE CLIMACTERIC STATES: ICD-10-CM

## 2019-09-03 PROCEDURE — 99213 OFFICE O/P EST LOW 20 MIN: CPT | Mod: 25 | Performed by: NURSE PRACTITIONER

## 2019-09-03 PROCEDURE — 87624 HPV HI-RISK TYP POOLED RSLT: CPT | Performed by: NURSE PRACTITIONER

## 2019-09-03 PROCEDURE — 99396 PREV VISIT EST AGE 40-64: CPT | Performed by: NURSE PRACTITIONER

## 2019-09-03 PROCEDURE — G0145 SCR C/V CYTO,THINLAYER,RESCR: HCPCS | Performed by: NURSE PRACTITIONER

## 2019-09-03 RX ORDER — FLUCONAZOLE 150 MG/1
150 TABLET ORAL
Qty: 2 TABLET | Refills: 3 | Status: SHIPPED | OUTPATIENT
Start: 2019-09-03 | End: 2021-02-03

## 2019-09-03 RX ORDER — ESTRADIOL 0.5 MG/1
0.5 TABLET ORAL DAILY
Qty: 90 TABLET | Refills: 3 | Status: ON HOLD | OUTPATIENT
Start: 2019-09-03 | End: 2020-09-28

## 2019-09-03 RX ORDER — PREDNISONE 10 MG/1
10 TABLET ORAL
COMMUNITY
Start: 2019-07-26

## 2019-09-03 RX ORDER — MEDROXYPROGESTERONE ACETATE 2.5 MG/1
2.5 TABLET ORAL DAILY
Qty: 90 TABLET | Refills: 3 | Status: ON HOLD | OUTPATIENT
Start: 2019-09-03 | End: 2020-09-28

## 2019-09-03 RX ORDER — BENZONATATE 200 MG/1
CAPSULE ORAL
Refills: 2 | COMMUNITY
Start: 2019-08-20 | End: 2019-09-25

## 2019-09-03 SDOH — HEALTH STABILITY: MENTAL HEALTH: HOW OFTEN DO YOU HAVE A DRINK CONTAINING ALCOHOL?: MONTHLY OR LESS

## 2019-09-03 SDOH — HEALTH STABILITY: MENTAL HEALTH: HOW OFTEN DO YOU HAVE 6 OR MORE DRINKS ON ONE OCCASION?: NEVER

## 2019-09-03 SDOH — HEALTH STABILITY: MENTAL HEALTH: HOW MANY STANDARD DRINKS CONTAINING ALCOHOL DO YOU HAVE ON A TYPICAL DAY?: 5 OR 6

## 2019-09-03 ASSESSMENT — ANXIETY QUESTIONNAIRES
7. FEELING AFRAID AS IF SOMETHING AWFUL MIGHT HAPPEN: NOT AT ALL
1. FEELING NERVOUS, ANXIOUS, OR ON EDGE: NOT AT ALL
2. NOT BEING ABLE TO STOP OR CONTROL WORRYING: NOT AT ALL
5. BEING SO RESTLESS THAT IT IS HARD TO SIT STILL: NOT AT ALL
6. BECOMING EASILY ANNOYED OR IRRITABLE: NOT AT ALL
GAD7 TOTAL SCORE: 0
IF YOU CHECKED OFF ANY PROBLEMS ON THIS QUESTIONNAIRE, HOW DIFFICULT HAVE THESE PROBLEMS MADE IT FOR YOU TO DO YOUR WORK, TAKE CARE OF THINGS AT HOME, OR GET ALONG WITH OTHER PEOPLE: NOT DIFFICULT AT ALL
3. WORRYING TOO MUCH ABOUT DIFFERENT THINGS: NOT AT ALL

## 2019-09-03 ASSESSMENT — PATIENT HEALTH QUESTIONNAIRE - PHQ9
5. POOR APPETITE OR OVEREATING: NOT AT ALL
SUM OF ALL RESPONSES TO PHQ QUESTIONS 1-9: 0

## 2019-09-03 ASSESSMENT — MIFFLIN-ST. JEOR: SCORE: 1587.45

## 2019-09-04 ASSESSMENT — ANXIETY QUESTIONNAIRES: GAD7 TOTAL SCORE: 0

## 2019-09-06 LAB
COPATH REPORT: NORMAL
PAP: NORMAL

## 2019-09-18 NOTE — PROGRESS NOTES
SUBJECTIVE:                                                   Linda Mina is a 54 year old female who presents to clinic today for the following health issue(s):  Patient presents with:  Ultrasound: follow up      HPI: here for follow up from pelvic US and PMB      No LMP recorded. Patient has had an ablation..   Patient is sexually active, No obstetric history on file..  Using menopause for contraception.    reports that she quit smoking about 17 years ago. Her smoking use included cigarettes. She has never used smokeless tobacco.    STD testing offered?  Declined    Health maintenance updated:  yes    Today's PHQ-2 Score:   PHQ-2 ( 1999 [employer]) 9/3/2019   Q1: Little interest or pleasure in doing things 0   Q2: Feeling down, depressed or hopeless 0   PHQ-2 Score 0     Today's PHQ-9 Score:   PHQ-9 SCORE 9/3/2019   PHQ-9 Total Score 0     Today's MARIANA-7 Score:   MARIANA-7 SCORE 9/3/2019   Total Score 0       Problem list and histories reviewed & adjusted, as indicated.  Additional history: as documented.    Patient Active Problem List   Diagnosis     Status post bilateral knee replacements     Past Surgical History:   Procedure Laterality Date     ARTHROPLASTY KNEE UNICOMPARTMENT  11/11/2013    Procedure: ARTHROPLASTY KNEE UNICOMPARTMENT;  BILATERAL UNICOMPARTMENTAL  KNEE ARTHROPLASTY (NANDA)^ ;  Surgeon: Jimbo Brown MD;  Location:  OR     ARTHROSCOPY SHOULDER DECOMPRESSION      right     BUNIONECTOMY       CARPAL TUNNEL RELEASE RT/LT       COLONOSCOPY N/A 8/17/2015    Procedure: COMBINED COLONOSCOPY, SINGLE OR MULTIPLE BIOPSY/POLYPECTOMY BY BIOPSY;  Surgeon: Dinorah Odom MD;  Location:  GI     LAPAROSCOPIC APPENDECTOMY N/A 10/21/2015    Procedure: LAPAROSCOPIC APPENDECTOMY;  Surgeon: Dinorah Odom MD;  Location:  SD     MAMMOPLASTY AUGMENTATION        Social History     Tobacco Use     Smoking status: Former Smoker     Types: Cigarettes     Last attempt to quit: 11/9/2001     Years  since quittin.8     Smokeless tobacco: Never Used   Substance Use Topics     Alcohol use: Yes     Frequency: Monthly or less     Drinks per session: 5 or 6     Binge frequency: Never     Comment: 2 glasses of wine 3 times a week      Problem (# of Occurrences) Relation (Name,Age of Onset)    Other Cancer (1) Father            Current Outpatient Medications   Medication Sig     Cholecalciferol (VITAMIN D3 PO) Take 2,000 Units by mouth daily      estradiol (ESTRACE) 0.5 MG tablet Take 1 tablet (0.5 mg) by mouth daily     Fexofenadine HCl (ALLEGRA ALLERGY PO) Take 180 mg by mouth     LEVOTHYROXINE SODIUM PO Take 112 mcg by mouth daily     LUTEIN PO Take 1 tablet by mouth daily     medroxyPROGESTERone (PROVERA) 2.5 MG tablet Take 1 tablet (2.5 mg) by mouth daily     mometasone (NASONEX) 50 MCG/ACT nasal spray Spray 2 sprays into both nostrils daily     multivitamin, therapeutic with minerals (MULTI-VITAMIN) TABS Take 1 tablet by mouth daily     olopatadine HCl (PATADAY) 0.2 % SOLN 1 drop as needed     VITAMIN E NATURAL PO Take by mouth daily      fluconazole (DIFLUCAN) 150 MG tablet Take 1 tablet (150 mg) by mouth every 3 days (Patient not taking: Reported on 2019)     predniSONE (DELTASONE) 10 MG tablet Take 10 mg by mouth     No current facility-administered medications for this visit.      Allergies   Allergen Reactions     Codeine      PN: LW Reaction: nausea/vomitting       ROS:  12 point review of systems negative other than symptoms noted below.    OBJECTIVE:     /80   Pulse 84   Wt 97.3 kg (214 lb 9.6 oz)   BMI 34.64 kg/m    Body mass index is 34.64 kg/m .    Exam:  Constitutional:  Appearance: Well nourished, well developed alert, in no acute distress   Discussed normal US .  Lining is very thin.  No spotting for last month now.    In-Clinic Test Results:  Results for orders placed or performed in visit on 19 (from the past 24 hour(s))   US Transvaginal Non OB    Narrative    US  Transvaginal Non OB   Order #: [id number] Accession #: [id number]   Study Notes      TwylaMarise on 9/25/2019  9:10 AM   Gynecological Ultrasound Report  Pelvic U/S - Transvaginal    [facility]  Referring Provider: Julieta Moran NP  Sonographer: Neha Wakefield RDMS  Indication: Postmenopausal bleeding  LMP (mm/dd/yyyy): Postmemopausal  History:   Gynecological Ultrasonography:   Uterus: retroverted  Size: 6.38 x 3.84 x 3.01cm.    Findings: Normal   Endometrium: Thickness total 1.88mm  Findings: Thin  Right Ovary: 1.08 x .80 x .90cm.    Left Ovary: 1.55 x 1.12 x .78cm.   Cul de Sac/Pouch of Edwin: No FF      Impression: Thin endometrial stripe, normal ovaries  ___________________________________________________________________________  _______           Discussed in office       ASSESSMENT/PLAN:                                                        ICD-10-CM    1. PMB (postmenopausal bleeding) N95.0        There are no Patient Instructions on file for this visit.    Return prn.    CARLOS Escalante [facility]

## 2019-09-25 ENCOUNTER — ANCILLARY PROCEDURE (OUTPATIENT)
Dept: ULTRASOUND IMAGING | Facility: CLINIC | Age: 54
End: 2019-09-25
Payer: COMMERCIAL

## 2019-09-25 ENCOUNTER — OFFICE VISIT (OUTPATIENT)
Dept: OBGYN | Facility: CLINIC | Age: 54
End: 2019-09-25
Payer: COMMERCIAL

## 2019-09-25 VITALS
DIASTOLIC BLOOD PRESSURE: 80 MMHG | SYSTOLIC BLOOD PRESSURE: 120 MMHG | WEIGHT: 214.6 LBS | BODY MASS INDEX: 34.64 KG/M2 | HEART RATE: 84 BPM

## 2019-09-25 DIAGNOSIS — N95.0 PMB (POSTMENOPAUSAL BLEEDING): ICD-10-CM

## 2019-09-25 DIAGNOSIS — N95.0 PMB (POSTMENOPAUSAL BLEEDING): Primary | ICD-10-CM

## 2019-09-25 PROCEDURE — 76830 TRANSVAGINAL US NON-OB: CPT | Performed by: OBSTETRICS & GYNECOLOGY

## 2019-09-25 PROCEDURE — 99212 OFFICE O/P EST SF 10 MIN: CPT | Performed by: NURSE PRACTITIONER

## 2019-10-13 DIAGNOSIS — N95.1 SYMPTOMATIC MENOPAUSAL OR FEMALE CLIMACTERIC STATES: ICD-10-CM

## 2019-10-14 RX ORDER — MEDROXYPROGESTERONE ACETATE 2.5 MG/1
TABLET ORAL
Qty: 30 TABLET | Refills: 0 | OUTPATIENT
Start: 2019-10-14

## 2019-10-14 NOTE — TELEPHONE ENCOUNTER
Requested Prescriptions   Pending Prescriptions Disp Refills     medroxyPROGESTERone (PROVERA) 2.5 MG tablet [Pharmacy Med Name: MEDROXYPROGESTERONE 2.5MG TABLETS] 30 tablet 0     Sig: TAKE 1 TABLET BY MOUTH DAILY       There is no refill protocol information for this order        Pt has refills available  Debbi Castellon RN on 10/14/2019 at 8:16 AM

## 2020-02-10 ENCOUNTER — HEALTH MAINTENANCE LETTER (OUTPATIENT)
Age: 55
End: 2020-02-10

## 2020-07-17 DIAGNOSIS — Z11.59 ENCOUNTER FOR SCREENING FOR OTHER VIRAL DISEASES: Primary | ICD-10-CM

## 2020-09-25 DIAGNOSIS — Z11.59 ENCOUNTER FOR SCREENING FOR OTHER VIRAL DISEASES: ICD-10-CM

## 2020-09-25 PROCEDURE — U0003 INFECTIOUS AGENT DETECTION BY NUCLEIC ACID (DNA OR RNA); SEVERE ACUTE RESPIRATORY SYNDROME CORONAVIRUS 2 (SARS-COV-2) (CORONAVIRUS DISEASE [COVID-19]), AMPLIFIED PROBE TECHNIQUE, MAKING USE OF HIGH THROUGHPUT TECHNOLOGIES AS DESCRIBED BY CMS-2020-01-R: HCPCS | Performed by: FAMILY MEDICINE

## 2020-09-26 LAB
SARS-COV-2 RNA SPEC QL NAA+PROBE: NOT DETECTED
SPECIMEN SOURCE: NORMAL

## 2020-09-28 ENCOUNTER — HOSPITAL ENCOUNTER (OUTPATIENT)
Facility: CLINIC | Age: 55
Discharge: HOME | End: 2020-09-28
Attending: COLON & RECTAL SURGERY | Admitting: COLON & RECTAL SURGERY
Payer: COMMERCIAL

## 2020-09-28 VITALS
RESPIRATION RATE: 10 BRPM | OXYGEN SATURATION: 99 % | SYSTOLIC BLOOD PRESSURE: 120 MMHG | TEMPERATURE: 98 F | HEART RATE: 84 BPM | BODY MASS INDEX: 33.82 KG/M2 | DIASTOLIC BLOOD PRESSURE: 74 MMHG | WEIGHT: 203 LBS | HEIGHT: 65 IN

## 2020-09-28 LAB — COLONOSCOPY: NORMAL

## 2020-09-28 PROCEDURE — 25000128 H RX IP 250 OP 636: Performed by: COLON & RECTAL SURGERY

## 2020-09-28 PROCEDURE — 99153 MOD SED SAME PHYS/QHP EA: CPT | Performed by: COLON & RECTAL SURGERY

## 2020-09-28 PROCEDURE — 45380 COLONOSCOPY AND BIOPSY: CPT | Performed by: COLON & RECTAL SURGERY

## 2020-09-28 PROCEDURE — G0500 MOD SEDAT ENDO SERVICE >5YRS: HCPCS | Performed by: COLON & RECTAL SURGERY

## 2020-09-28 PROCEDURE — 88305 TISSUE EXAM BY PATHOLOGIST: CPT | Mod: 26 | Performed by: COLON & RECTAL SURGERY

## 2020-09-28 PROCEDURE — 88305 TISSUE EXAM BY PATHOLOGIST: CPT | Performed by: COLON & RECTAL SURGERY

## 2020-09-28 PROCEDURE — 45385 COLONOSCOPY W/LESION REMOVAL: CPT | Performed by: COLON & RECTAL SURGERY

## 2020-09-28 RX ORDER — ONDANSETRON 2 MG/ML
4 INJECTION INTRAMUSCULAR; INTRAVENOUS EVERY 6 HOURS PRN
Status: DISCONTINUED | OUTPATIENT
Start: 2020-09-28 | End: 2020-10-01 | Stop reason: HOSPADM

## 2020-09-28 RX ORDER — DIPHENHYDRAMINE HYDROCHLORIDE 50 MG/ML
25 INJECTION INTRAMUSCULAR; INTRAVENOUS EVERY 4 HOURS PRN
Status: DISCONTINUED | OUTPATIENT
Start: 2020-09-28 | End: 2020-10-01 | Stop reason: HOSPADM

## 2020-09-28 RX ORDER — DIPHENHYDRAMINE HCL 25 MG
25 CAPSULE ORAL EVERY 4 HOURS PRN
Status: DISCONTINUED | OUTPATIENT
Start: 2020-09-28 | End: 2020-10-01 | Stop reason: HOSPADM

## 2020-09-28 RX ORDER — ONDANSETRON 2 MG/ML
4 INJECTION INTRAMUSCULAR; INTRAVENOUS
Status: COMPLETED | OUTPATIENT
Start: 2020-09-28 | End: 2020-09-28

## 2020-09-28 RX ORDER — NALOXONE HYDROCHLORIDE 0.4 MG/ML
.1-.4 INJECTION, SOLUTION INTRAMUSCULAR; INTRAVENOUS; SUBCUTANEOUS
Status: DISCONTINUED | OUTPATIENT
Start: 2020-09-28 | End: 2020-09-29 | Stop reason: HOSPADM

## 2020-09-28 RX ORDER — FENTANYL CITRATE 50 UG/ML
INJECTION, SOLUTION INTRAMUSCULAR; INTRAVENOUS PRN
Status: DISCONTINUED | OUTPATIENT
Start: 2020-09-28 | End: 2020-10-01 | Stop reason: HOSPADM

## 2020-09-28 RX ORDER — LIDOCAINE 40 MG/G
CREAM TOPICAL
Status: DISCONTINUED | OUTPATIENT
Start: 2020-09-28 | End: 2020-10-01 | Stop reason: HOSPADM

## 2020-09-28 RX ORDER — PROCHLORPERAZINE MALEATE 10 MG
10 TABLET ORAL EVERY 6 HOURS PRN
Status: DISCONTINUED | OUTPATIENT
Start: 2020-09-28 | End: 2020-10-01 | Stop reason: HOSPADM

## 2020-09-28 RX ORDER — FLUMAZENIL 0.1 MG/ML
0.2 INJECTION, SOLUTION INTRAVENOUS
Status: DISCONTINUED | OUTPATIENT
Start: 2020-09-28 | End: 2020-09-28 | Stop reason: HOSPADM

## 2020-09-28 RX ORDER — ONDANSETRON 4 MG/1
4 TABLET, ORALLY DISINTEGRATING ORAL EVERY 6 HOURS PRN
Status: DISCONTINUED | OUTPATIENT
Start: 2020-09-28 | End: 2020-10-01 | Stop reason: HOSPADM

## 2020-09-28 ASSESSMENT — MIFFLIN-ST. JEOR: SCORE: 1516.68

## 2020-09-28 NOTE — OP NOTE
See Provation Note In Chart    Patsy Correa MD  [facility]  Office Phone # [phone]

## 2020-09-29 LAB — COPATH REPORT: NORMAL

## 2020-10-16 NOTE — PROGRESS NOTES
SUBJECTIVE:                                                   Linda Mina is a 55 year old female who presents to clinic today for the following health issue(s):  Patient presents with:  Vaginal Problem  Imm/Inj: Flu Shot      HPI:   Patient is here for couple of things, She wants her pap smear done today, declined a breast exam.  She also had a episode, where she felt something was fall out of vagina, IC was painful at that time also.  She states that it lasted for a few days and now she has had it recently.  She did some reading and thought maybe she had uterine prolapse. She denies any urinary concerns, bowel movements are normal. She had a left should done this year, so just starting to lift light weights.  Boys are both home doing distant learning.      No LMP recorded. Patient has had an ablation.    Patient is sexually active, .  Using tubal ligation for contraception.    reports that she quit smoking about 18 years ago. Her smoking use included cigarettes. She has never used smokeless tobacco.    STD testing offered?  Declined    Health maintenance updated:  no    Today's PHQ-2 Score:   PHQ-2 (  [employer]) 9/3/2019   Q1: Little interest or pleasure in doing things 0   Q2: Feeling down, depressed or hopeless 0   PHQ-2 Score 0     Today's PHQ-9 Score:   PHQ-9 SCORE 9/3/2019   PHQ-9 Total Score 0     Today's MARIANA-7 Score:   MARIANA-7 SCORE 9/3/2019   Total Score 0       Problem list and histories reviewed & adjusted, as indicated.  Additional history: as documented.    Patient Active Problem List   Diagnosis     Status post bilateral knee replacements     Past Surgical History:   Procedure Laterality Date     ARTHROPLASTY KNEE UNICOMPARTMENT  2013    Procedure: ARTHROPLASTY KNEE UNICOMPARTMENT;  BILATERAL UNICOMPARTMENTAL  KNEE ARTHROPLASTY (NANDA)^ ;  Surgeon: Jimbo Brown MD;  Location: [facility]     ARTHROSCOPY SHOULDER DECOMPRESSION      right and left     BUNIONECTOMY       CARPAL TUNNEL  RELEASE RT/LT       COLONOSCOPY N/A 2015    Procedure: COMBINED COLONOSCOPY, SINGLE OR MULTIPLE BIOPSY/POLYPECTOMY BY BIOPSY;  Surgeon: Dinorah Odom MD;  Location:  GI     COLONOSCOPY N/A 2020    Procedure: COLONOSCOPY, WITH POLYPECTOMY AND BIOPSY;  Surgeon: Loulou Correa MD;  Location:  GI     LAPAROSCOPIC APPENDECTOMY N/A 10/21/2015    Procedure: LAPAROSCOPIC APPENDECTOMY;  Surgeon: Dinorah Odom MD;  Location:  [facility]     MAMMOPLASTY AUGMENTATION        Social History     Tobacco Use     Smoking status: Former Smoker     Types: Cigarettes     Quit date: 2001     Years since quittin.9     Smokeless tobacco: Never Used   Substance Use Topics     Alcohol use: Yes     Frequency: Monthly or less     Drinks per session: 5 or 6     Binge frequency: Never     Comment: 2 glasses of wine 3 times a week      Problem (# of Occurrences) Relation (Name,Age of Onset)    Other Cancer (1) Father            Current Outpatient Medications   Medication Sig     buPROPion (WELLBUTRIN XL) 150 MG 24 hr tablet Take 150 mg by mouth daily     cetirizine (ZYRTEC) 10 MG tablet Take 1 tablet by mouth daily     Cholecalciferol (VITAMIN D3 PO) Take 2,000 Units by mouth daily      conjugated estrogens (PREMARIN) 0.625 MG/GM vaginal cream Place 0.5 g vaginally twice a week New Sig:  Place 0.5mg vaginally     fluconazole (DIFLUCAN) 150 MG tablet Take 1 tablet (150 mg) by mouth every 3 days     LEVOTHYROXINE SODIUM PO Take 112 mcg by mouth daily     LUTEIN PO Take 1 tablet by mouth daily     mometasone (NASONEX) 50 MCG/ACT nasal spray Spray 2 sprays into both nostrils daily     multivitamin, therapeutic with minerals (MULTI-VITAMIN) TABS Take 1 tablet by mouth daily     olopatadine HCl (PATADAY) 0.2 % SOLN 1 drop as needed     omeprazole (PRILOSEC) 20 MG DR capsule Take 20 mg by mouth daily     VITAMIN E NATURAL PO Take by mouth daily      Fexofenadine HCl (ALLEGRA ALLERGY PO) Take 180 mg by mouth      predniSONE (DELTASONE) 10 MG tablet Take 10 mg by mouth     No current facility-administered medications for this visit.      Allergies   Allergen Reactions     Codeine      PN: LW Reaction: nausea/vomitting       ROS:  12 point review of systems negative other than symptoms noted below or in the HPI.  No urinary frequency or dysuria, bladder or kidney problems      OBJECTIVE:     /74   Wt 93 kg (205 lb)   BMI 34.11 kg/m    Body mass index is 34.11 kg/m .    Exam:  Constitutional:  Appearance: Well nourished, well developed alert, in no acute distress  Pelvic Exam:  External Genitalia:     Normal appearance for age, no discharge present, no tenderness present, no inflammatory lesions present, color normal  Vagina:     Normal vaginal vault without central or paravaginal defects, no discharge present, no inflammatory lesions present, no masses present small rectocele noted.  Had patient bearing down and everything pelvic wise was in place no prolapse noted.  She does have some atrophy.  Bladder:     Nontender to palpation  Urethra:   Urethral Body:  Urethra palpation normal, urethra structural support normal   Urethral Meatus:  No erythema or lesions present  Cervix:     Appearance healthy, no lesions present, nontender to palpation, no bleeding present  Uterus:     Uterus: firm, normal sized and nontender, anteverted in position.   Adnexa:     No adnexal tenderness present, no adnexal masses present  Perineum:     Perineum within normal limits, no evidence of trauma, no rashes or skin lesions present  Anus:     Anus within normal limits, no hemorrhoids present  Inguinal Lymph Nodes:     No lymphadenopathy present  Pubic Hair:     Normal pubic hair distribution for age  Genitalia and Groin:     No rashes present, no lesions present, no areas of discoloration, no masses present       In-Clinic Test Results:  No results found for this or any previous visit (from the past 24 hour(s)).    ASSESSMENT/PLAN:                                                         ICD-10-CM    1. Encounter for gynecological examination without abnormal finding  Z01.419 Pap imaged thin layer screen with HPV - recommended age 30 - 65 years (select HPV order below)     HPV High Risk Types DNA Cervical   2. Atrophy of vagina  N95.2 conjugated estrogens (PREMARIN) 0.625 MG/GM vaginal cream   3. Need for prophylactic vaccination and inoculation against influenza  Z23 INFLUENZA QUAD, RECOMBINANT, P-FREE (RIV4) (FLUBLOCK) [97699]     Vaccine Administration, Initial [53000]       There are no Patient Instructions on file for this visit.    Normal pelvic exam.  Pap done.  Discussed her using estrogen cream vaginally, patient would like to try.  Also recommended if this feeling of something dropping in vagina happens again to call and see me ASAP.  Patient agreed with this plan.    CARLOS Escalante CNP  [facility] [place]

## 2020-10-19 ENCOUNTER — OFFICE VISIT (OUTPATIENT)
Dept: OBGYN | Facility: CLINIC | Age: 55
End: 2020-10-19
Payer: COMMERCIAL

## 2020-10-19 VITALS — DIASTOLIC BLOOD PRESSURE: 74 MMHG | WEIGHT: 205 LBS | BODY MASS INDEX: 34.11 KG/M2 | SYSTOLIC BLOOD PRESSURE: 118 MMHG

## 2020-10-19 DIAGNOSIS — N95.2 ATROPHY OF VAGINA: ICD-10-CM

## 2020-10-19 DIAGNOSIS — Z01.419 ENCOUNTER FOR GYNECOLOGICAL EXAMINATION WITHOUT ABNORMAL FINDING: Primary | ICD-10-CM

## 2020-10-19 DIAGNOSIS — Z23 NEED FOR PROPHYLACTIC VACCINATION AND INOCULATION AGAINST INFLUENZA: ICD-10-CM

## 2020-10-19 PROCEDURE — G0145 SCR C/V CYTO,THINLAYER,RESCR: HCPCS | Performed by: NURSE PRACTITIONER

## 2020-10-19 PROCEDURE — 99213 OFFICE O/P EST LOW 20 MIN: CPT | Mod: 25 | Performed by: NURSE PRACTITIONER

## 2020-10-19 PROCEDURE — 90682 RIV4 VACC RECOMBINANT DNA IM: CPT | Performed by: NURSE PRACTITIONER

## 2020-10-19 PROCEDURE — 87624 HPV HI-RISK TYP POOLED RSLT: CPT | Performed by: NURSE PRACTITIONER

## 2020-10-19 PROCEDURE — 90471 IMMUNIZATION ADMIN: CPT | Performed by: NURSE PRACTITIONER

## 2020-10-19 RX ORDER — CETIRIZINE HYDROCHLORIDE 10 MG/1
1 TABLET ORAL DAILY
COMMUNITY
Start: 2019-08-20 | End: 2023-07-18

## 2020-10-19 RX ORDER — BUPROPION HYDROCHLORIDE 150 MG/1
150 TABLET ORAL DAILY
COMMUNITY
Start: 2020-03-19 | End: 2021-02-01

## 2020-10-23 LAB
COPATH REPORT: NORMAL
PAP: NORMAL

## 2020-11-16 ENCOUNTER — HEALTH MAINTENANCE LETTER (OUTPATIENT)
Age: 55
End: 2020-11-16

## 2021-02-01 ENCOUNTER — OFFICE VISIT (OUTPATIENT)
Dept: OBGYN | Facility: CLINIC | Age: 56
End: 2021-02-01
Payer: COMMERCIAL

## 2021-02-01 VITALS
DIASTOLIC BLOOD PRESSURE: 64 MMHG | HEIGHT: 65 IN | SYSTOLIC BLOOD PRESSURE: 116 MMHG | WEIGHT: 196 LBS | HEART RATE: 78 BPM | BODY MASS INDEX: 32.65 KG/M2

## 2021-02-01 DIAGNOSIS — N32.9 BLADDER PROBLEM: Primary | ICD-10-CM

## 2021-02-01 DIAGNOSIS — B37.31 YEAST INFECTION OF THE VAGINA: ICD-10-CM

## 2021-02-01 LAB
ALBUMIN UR-MCNC: NEGATIVE MG/DL
APPEARANCE UR: CLEAR
BILIRUB UR QL STRIP: NEGATIVE
COLOR UR AUTO: YELLOW
GLUCOSE UR STRIP-MCNC: NEGATIVE MG/DL
HGB UR QL STRIP: ABNORMAL
KETONES UR STRIP-MCNC: NEGATIVE MG/DL
LEUKOCYTE ESTERASE UR QL STRIP: NEGATIVE
NITRATE UR QL: NEGATIVE
PH UR STRIP: 6.5 PH (ref 5–7)
RBC #/AREA URNS AUTO: ABNORMAL /HPF
SOURCE: ABNORMAL
SP GR UR STRIP: <=1.005 (ref 1–1.03)
UROBILINOGEN UR STRIP-ACNC: 0.2 EU/DL (ref 0.2–1)
WBC #/AREA URNS AUTO: ABNORMAL /HPF

## 2021-02-01 PROCEDURE — 81001 URINALYSIS AUTO W/SCOPE: CPT | Performed by: NURSE PRACTITIONER

## 2021-02-01 PROCEDURE — 99212 OFFICE O/P EST SF 10 MIN: CPT | Performed by: NURSE PRACTITIONER

## 2021-02-01 RX ORDER — BUPROPION HYDROCHLORIDE 300 MG/1
TABLET ORAL
COMMUNITY
Start: 2020-11-25 | End: 2023-07-18

## 2021-02-01 RX ORDER — LEVOTHYROXINE SODIUM 112 UG/1
TABLET ORAL
COMMUNITY
Start: 2020-11-25 | End: 2023-10-26

## 2021-02-01 ASSESSMENT — MIFFLIN-ST. JEOR: SCORE: 1484.93

## 2021-02-01 NOTE — PROGRESS NOTES
SUBJECTIVE:                                                   Linda Mina is a 55 year old female who presents to clinic today for the following health issue(s):  Patient presents with:  Bladder Problems: c/o possible uterine prolapse- states it is now bigger than before      HPI: Patient started to have some vaginal itching and took a mirror to look in vagina area and noticed this bulge at opening.  She states it is bigger than was before.  Still comes and goes.  Denies any problems with   Bowel movements.  IC is painful all the time per patient.        No LMP recorded. Patient has had an ablation..     Patient is sexually active, .  Using tubal ligation for contraception.    reports that she quit smoking about 19 years ago. Her smoking use included cigarettes. She has never used smokeless tobacco.    STD testing offered?  Declined    Health maintenance updated:  yes    Problem list and histories reviewed & adjusted, as indicated.  Additional history: as documented.    Patient Active Problem List   Diagnosis     Status post bilateral knee replacements     Past Surgical History:   Procedure Laterality Date     ARTHROPLASTY KNEE UNICOMPARTMENT  2013    Procedure: ARTHROPLASTY KNEE UNICOMPARTMENT;  BILATERAL UNICOMPARTMENTAL  KNEE ARTHROPLASTY (NANDA)^ ;  Surgeon: Jimbo Brown MD;  Location:  OR     ARTHROSCOPY SHOULDER DECOMPRESSION      right and left     BUNIONECTOMY       CARPAL TUNNEL RELEASE RT/LT       COLONOSCOPY N/A 2015    Procedure: COMBINED COLONOSCOPY, SINGLE OR MULTIPLE BIOPSY/POLYPECTOMY BY BIOPSY;  Surgeon: Dinorah Odom MD;  Location:  GI     COLONOSCOPY N/A 2020    Procedure: COLONOSCOPY, WITH POLYPECTOMY AND BIOPSY;  Surgeon: Loulou Correa MD;  Location:  GI     LAPAROSCOPIC APPENDECTOMY N/A 10/21/2015    Procedure: LAPAROSCOPIC APPENDECTOMY;  Surgeon: Dinorah Odom MD;  Location:  [facility]     MAMMOPLASTY AUGMENTATION        Social History      Tobacco Use     Smoking status: Former Smoker     Types: Cigarettes     Quit date: 2001     Years since quittin.2     Smokeless tobacco: Never Used   Substance Use Topics     Alcohol use: Yes     Frequency: Monthly or less     Drinks per session: 5 or 6     Binge frequency: Never     Comment: 2 glasses of wine 3 times a week      Problem (# of Occurrences) Relation (Name,Age of Onset)    No Known Problems (7) Mother, Sister, Brother, Maternal Grandmother, Maternal Grandfather, Paternal Grandmother, Other    Other Cancer (1) Father            Current Outpatient Medications   Medication Sig     buPROPion (WELLBUTRIN XL) 300 MG 24 hr tablet TK 1 T PO QD     cetirizine (ZYRTEC) 10 MG tablet Take 1 tablet by mouth daily     Cholecalciferol (VITAMIN D3 PO) Take 2,000 Units by mouth daily      conjugated estrogens (PREMARIN) 0.625 MG/GM vaginal cream Place 0.5 g vaginally twice a week New Sig:  Place 0.5mg vaginally     Fexofenadine HCl (ALLEGRA ALLERGY PO) Take 180 mg by mouth     levothyroxine (SYNTHROID/LEVOTHROID) 112 MCG tablet TK 1 T PO QD     LUTEIN PO Take 1 tablet by mouth daily     mometasone (NASONEX) 50 MCG/ACT nasal spray Spray 2 sprays into both nostrils daily     multivitamin, therapeutic with minerals (MULTI-VITAMIN) TABS Take 1 tablet by mouth daily     olopatadine HCl (PATADAY) 0.2 % SOLN 1 drop as needed     omeprazole (PRILOSEC) 20 MG DR capsule Take 20 mg by mouth daily     predniSONE (DELTASONE) 10 MG tablet Take 10 mg by mouth     VITAMIN E NATURAL PO Take by mouth daily      fluconazole (DIFLUCAN) 150 MG tablet Take 1 tablet (150 mg) by mouth every 3 days (Patient not taking: Reported on 2021)     No current facility-administered medications for this visit.      Allergies   Allergen Reactions     Codeine      PN: LW Reaction: nausea/vomitting  Other reaction(s): *Unknown  PN: LW Reaction: nausea/vomitting       ROS:  12 point review of systems negative other than symptoms noted  "below or in the HPI.  Genitourinary: prolapse  No urinary frequency or dysuria, bladder or kidney problems      OBJECTIVE:     /64   Pulse 78   Ht 1.651 m (5' 5\")   Wt 88.9 kg (196 lb)   BMI 32.62 kg/m    Body mass index is 32.62 kg/m .    Exam:  Constitutional:  Appearance: Well nourished, well developed alert, in no acute distress  Psychiatric:  Mentation appears normal and affect normal/bright.  Pelvic Exam:  External Genitalia:     Normal appearance for age, no discharge present, no tenderness present, no inflammatory lesions present, color normal  Vagina:     Normal vaginal vault without central or paravaginal defects, no discharge present, no inflammatory lesions present, no masses present low grade rectocele noted.  Upon bearing down, no other prolapse noted.  Bladder:     Nontender to palpation  Urethra:   Urethral Body:  Urethra palpation normal, urethra structural support normal   Urethral Meatus:  No erythema or lesions present  Cervix:     Appearance healthy, no lesions present, nontender to palpation, no bleeding present  Uterus:     Uterus: firm, normal sized and nontender, anteverted in position.   Adnexa:     No adnexal tenderness present, no adnexal masses present  Perineum:     Perineum within normal limits, no evidence of trauma, no rashes or skin lesions present  Anus:     Anus within normal limits, no hemorrhoids present  Inguinal Lymph Nodes:     No lymphadenopathy present  Pubic Hair:     Normal pubic hair distribution for age  Genitalia and Groin:     No rashes present, no lesions present, no areas of discoloration, no masses present       In-Clinic Test Results:  Results for orders placed or performed in visit on 02/01/21 (from the past 24 hour(s))   UA with Microscopic   Result Value Ref Range    Color Urine Yellow     Appearance Urine Clear     Glucose Urine Negative NEG^Negative mg/dL    Bilirubin Urine Negative NEG^Negative    Ketones Urine Negative NEG^Negative mg/dL    " Specific Gravity Urine <=1.005 1.003 - 1.035    pH Urine 6.5 5.0 - 7.0 pH    Protein Albumin Urine Negative NEG^Negative mg/dL    Urobilinogen Urine 0.2 0.2 - 1.0 EU/dL    Nitrite Urine Negative NEG^Negative    Blood Urine Trace (A) NEG^Negative    Leukocyte Esterase Urine Negative NEG^Negative    Source Midstream Urine     WBC Urine 0 - 5 OTO5^0 - 5 /HPF    RBC Urine O - 2 OTO2^O - 2 /HPF       ASSESSMENT/PLAN:                                                        ICD-10-CM    1. Bladder problem  N32.9 UA with Microscopic       There are no Patient Instructions on file for this visit.    Possible low grade rectocele.Patient to see Dr. Wick for consult on possible rectocele    CARLOS Escalante CNP  [facility] [place]

## 2021-02-03 DIAGNOSIS — B37.31 YEAST INFECTION OF THE VAGINA: ICD-10-CM

## 2021-02-03 RX ORDER — FLUCONAZOLE 150 MG/1
150 TABLET ORAL
Qty: 2 TABLET | Refills: 3 | Status: SHIPPED | OUTPATIENT
Start: 2021-02-03 | End: 2023-10-09

## 2021-02-03 NOTE — TELEPHONE ENCOUNTER
Requested Prescriptions   Pending Prescriptions Disp Refills     fluconazole (DIFLUCAN) 150 MG tablet 2 tablet 3     Sig: Take 1 tablet (150 mg) by mouth every 3 days       There is no refill protocol information for this order        Last Written Prescription Date:  9/3/19  Last Fill Quantity: 2,  # refills: 3   Last office visit: 2/1/2021 with prescribing provider:  Roz Moran   Future Office Visit:   Next 5 appointments (look out 90 days)    Feb 08, 2021  3:15 PM  SHORT with Yang Wick MD  [facility] ([facility][1]) [address] [address]  [phone]         Routing to provider - ok to send refills? Has received Rx in past with refills.    Mayra Hooper RN on 2/3/2021 at 12:10 PM

## 2021-02-05 NOTE — PROGRESS NOTES
SUBJECTIVE:                                                   Linda Mina is a 55 year old female who presents to clinic today for the following health issue(s):  Patient presents with:  Consult: Possible prolapse bladder        HPI: The patient is seen at this time for a change in her pelvic floor architecture and an new sensation of prolapse.  She is a  2 with 2 normal vaginal deliveries of 8+ pound babies.  She has some urgency but rare stress incontinence.      No LMP recorded. Patient has had an ablation..     Patient is not sexually active, .  Using tubal ligation for contraception.    reports that she quit smoking about 19 years ago. Her smoking use included cigarettes. She has never used smokeless tobacco.      Health maintenance updated:  yes    Today's PHQ-2 Score:   PHQ-2 (  [employer]) 9/3/2019   Q1: Little interest or pleasure in doing things 0   Q2: Feeling down, depressed or hopeless 0   PHQ-2 Score 0     Today's PHQ-9 Score:   PHQ-9 SCORE 9/3/2019   PHQ-9 Total Score 0     Today's MARIANA-7 Score:   MARIANA-7 SCORE 9/3/2019   Total Score 0       Problem list and histories reviewed & adjusted, as indicated.  Additional history: as documented.    Patient Active Problem List   Diagnosis     Status post bilateral knee replacements     Past Surgical History:   Procedure Laterality Date     ARTHROPLASTY KNEE UNICOMPARTMENT  2013    Procedure: ARTHROPLASTY KNEE UNICOMPARTMENT;  BILATERAL UNICOMPARTMENTAL  KNEE ARTHROPLASTY (NANDA)^ ;  Surgeon: Jimbo Brown MD;  Location:  OR     ARTHROSCOPY SHOULDER DECOMPRESSION      right and left     BUNIONECTOMY       CARPAL TUNNEL RELEASE RT/LT       COLONOSCOPY N/A 2015    Procedure: COMBINED COLONOSCOPY, SINGLE OR MULTIPLE BIOPSY/POLYPECTOMY BY BIOPSY;  Surgeon: Dinorah Odom MD;  Location:  GI     COLONOSCOPY N/A 2020    Procedure: COLONOSCOPY, WITH POLYPECTOMY AND BIOPSY;  Surgeon: Loulou Correa MD;  Location:   GI     LAPAROSCOPIC APPENDECTOMY N/A 10/21/2015    Procedure: LAPAROSCOPIC APPENDECTOMY;  Surgeon: Dinorah Odom MD;  Location: [facility]     MAMMOPLASTY AUGMENTATION        Social History     Tobacco Use     Smoking status: Former Smoker     Types: Cigarettes     Quit date: 2001     Years since quittin.2     Smokeless tobacco: Never Used   Substance Use Topics     Alcohol use: Yes     Frequency: Monthly or less     Drinks per session: 5 or 6     Binge frequency: Never     Comment: 2 glasses of wine 3 times a week      Problem (# of Occurrences) Relation (Name,Age of Onset)    No Known Problems (7) Mother, Sister, Brother, Maternal Grandmother, Maternal Grandfather, Paternal Grandmother, Other    Other Cancer (1) Father            Current Outpatient Medications   Medication Sig     buPROPion (WELLBUTRIN XL) 300 MG 24 hr tablet TK 1 T PO QD     cetirizine (ZYRTEC) 10 MG tablet Take 1 tablet by mouth daily     Cholecalciferol (VITAMIN D3 PO) Take 2,000 Units by mouth daily      levothyroxine (SYNTHROID/LEVOTHROID) 112 MCG tablet TK 1 T PO QD     LUTEIN PO Take 1 tablet by mouth daily     mometasone (NASONEX) 50 MCG/ACT nasal spray Spray 2 sprays into both nostrils daily     multivitamin, therapeutic with minerals (MULTI-VITAMIN) TABS Take 1 tablet by mouth daily     omeprazole (PRILOSEC) 20 MG DR capsule Take 20 mg by mouth daily     VITAMIN E NATURAL PO Take by mouth daily      Fexofenadine HCl (ALLEGRA ALLERGY PO) Take 180 mg by mouth     fluconazole (DIFLUCAN) 150 MG tablet Take 1 tablet (150 mg) by mouth every 3 days (Patient not taking: Reported on 2021)     olopatadine HCl (PATADAY) 0.2 % SOLN 1 drop as needed     predniSONE (DELTASONE) 10 MG tablet Take 10 mg by mouth     No current facility-administered medications for this visit.      Allergies   Allergen Reactions     Codeine      PN: LW Reaction: nausea/vomitting  Other reaction(s): *Unknown  PN: LW Reaction: nausea/vomitting       ROS:  12  "point review of systems negative other than symptoms noted below or in the HPI.  Genitourinary: Painful [place]  No urinary frequency or dysuria, bladder or kidney problems      OBJECTIVE:     /78   Ht 1.651 m (5' 5\")   Wt 89.5 kg (197 lb 6.4 oz)   Breastfeeding No   BMI 32.85 kg/m    Body mass index is 32.85 kg/m .    Exam:  Constitutional:  Appearance: Well nourished, well developed alert, in no acute distress  Gastrointestinal:  Abdominal Examination:  Abdomen nontender to palpation, tone normal without rigidity or guarding, no masses present, umbilicus without lesions; Liver/Spleen:  No hepatomegaly present, liver nontender to palpation; Hernias:  No hernias present  Lymphatic: Lymph Nodes:  No other lymphadenopathy present  Skin: General Inspection:  No rashes present, no lesions present, no areas of discoloration.  Neurologic:  Mental Status:  Oriented X3.  Normal strength and tone, sensory exam grossly normal, mentation intact and speech normal.    Psychiatric:  Mentation appears normal and affect normal/bright.  Pelvic Exam:  External Genitalia:     Normal appearance for age, no discharge present, no tenderness present, no inflammatory lesions present, color normal  Vagina: Small cystocele and moderate rectocele present   , no discharge present, no inflammatory lesions present, no masses present  Bladder:     Nontender to palpation  Urethra:   Urethral Body:  Urethra palpation normal, urethra structural support normal   Urethral Meatus:  No erythema or lesions present  Cervix:     Appearance healthy, no lesions present, nontender to palpation, no bleeding present  Uterus:     Uterus: firm, normal sized and nontender, midplane in position.   Adnexa:     No adnexal tenderness present, no adnexal masses present  Perineum:     Perineum within normal limits, no evidence of trauma, no rashes or skin lesions present  Anus:     Anus within normal limits, no hemorrhoids present  Inguinal Lymph Nodes:  "    No lymphadenopathy present  Pubic Hair:     Normal pubic hair distribution for age  Genitalia and Groin:     No rashes present, no lesions present, no areas of discoloration, no masses present       In-Clinic Test Results:      ASSESSMENT/PLAN:                                                      55-year-old multigravida with new findings of a small cystocele but more prominent rectocele.  We have discussed the anatomy of these conditions.  We reviewed her initiation of Kegel exercises to strengthen her pelvic floor.  She will return in 4 months.        Yang Wick MD  [facility] [place]

## 2021-02-08 ENCOUNTER — OFFICE VISIT (OUTPATIENT)
Dept: OBGYN | Facility: CLINIC | Age: 56
End: 2021-02-08
Payer: COMMERCIAL

## 2021-02-08 VITALS
BODY MASS INDEX: 32.89 KG/M2 | DIASTOLIC BLOOD PRESSURE: 78 MMHG | HEIGHT: 65 IN | SYSTOLIC BLOOD PRESSURE: 130 MMHG | WEIGHT: 197.4 LBS

## 2021-02-08 DIAGNOSIS — N39.3 FEMALE STRESS INCONTINENCE: ICD-10-CM

## 2021-02-08 DIAGNOSIS — N81.6 RECTOCELE: Primary | ICD-10-CM

## 2021-02-08 PROCEDURE — 99212 OFFICE O/P EST SF 10 MIN: CPT | Performed by: OBSTETRICS & GYNECOLOGY

## 2021-02-08 ASSESSMENT — MIFFLIN-ST. JEOR: SCORE: 1491.28

## 2021-02-19 ENCOUNTER — TELEPHONE (OUTPATIENT)
Dept: OBGYN | Facility: CLINIC | Age: 56
End: 2021-02-19

## 2021-06-08 ENCOUNTER — OFFICE VISIT (OUTPATIENT)
Dept: OBGYN | Facility: CLINIC | Age: 56
End: 2021-06-08
Payer: COMMERCIAL

## 2021-06-08 VITALS — WEIGHT: 199 LBS | BODY MASS INDEX: 33.12 KG/M2 | DIASTOLIC BLOOD PRESSURE: 80 MMHG | SYSTOLIC BLOOD PRESSURE: 124 MMHG

## 2021-06-08 DIAGNOSIS — N81.11 CYSTOCELE, MIDLINE: Primary | ICD-10-CM

## 2021-06-08 PROCEDURE — 99212 OFFICE O/P EST SF 10 MIN: CPT | Performed by: OBSTETRICS & GYNECOLOGY

## 2021-06-08 NOTE — PROGRESS NOTES
SUBJECTIVE:                                                   Linda Mina is a 55 year old female who presents to clinic today for the following health issue(s):  Patient presents with:  Urinary Problem: f/u to bladder, prolapse not as bothersome, no tnoticing protruding      HPI: Patient is seen at this time for follow-up of some perineal pressure.  She has been noted to have a cystocele and small rectocele.  She has been highly compliant with Kegel exercises for pelvic floor and avoiding any lifting.  She empties her bladder frequently.  She denies any stress incontinence at this time.  She has no sexual dysfunction.      No LMP recorded. Patient has had an ablation..     Patient is not sexually active, .  Using menopause for contraception.    reports that she quit smoking about 19 years ago. Her smoking use included cigarettes. She has never used smokeless tobacco.    STD testing offered?  Declined    Health maintenance updated:  yes    Today's PHQ-2 Score:   PHQ-2 (  [employer]) 9/3/2019   Q1: Little interest or pleasure in doing things 0   Q2: Feeling down, depressed or hopeless 0   PHQ-2 Score 0     Problem list and histories reviewed & adjusted, as indicated.  Additional history: as documented.    Patient Active Problem List   Diagnosis     Status post bilateral knee replacements     Past Surgical History:   Procedure Laterality Date     ARTHROPLASTY KNEE UNICOMPARTMENT  2013    Procedure: ARTHROPLASTY KNEE UNICOMPARTMENT;  BILATERAL UNICOMPARTMENTAL  KNEE ARTHROPLASTY (NAN[place])^ ;  Surgeon: Jimbo Brown MD;  Location:  OR     ARTHROSCOPY SHOULDER DECOMPRESSION      right and left     BUNIONECTOMY       CARPAL TUNNEL RELEASE RT/LT       COLONOSCOPY N/A 2015    Procedure: COMBINED COLONOSCOPY, SINGLE OR MULTIPLE BIOPSY/POLYPECTOMY BY BIOPSY;  Surgeon: Dinorah Odom MD;  Location:  GI     COLONOSCOPY N/A 2020    Procedure: COLONOSCOPY, WITH POLYPECTOMY AND BIOPSY;   Surgeon: Loulou Correa MD;  Location:  GI     LAPAROSCOPIC APPENDECTOMY N/A 10/21/2015    Procedure: LAPAROSCOPIC APPENDECTOMY;  Surgeon: Dinorah Odom MD;  Location:  SD     MAMMOPLASTY AUGMENTATION        Social History     Tobacco Use     Smoking status: Former Smoker     Types: Cigarettes     Quit date: 2001     Years since quittin.5     Smokeless tobacco: Never Used   Substance Use Topics     Alcohol use: Yes     Frequency: Monthly or less     Drinks per session: 5 or 6     Binge frequency: Never     Comment: 2 glasses of wine 3 times a week      Problem (# of Occurrences) Relation (Name,Age of Onset)    No Known Problems (7) Mother, Sister, Brother, Maternal Grandmother, Maternal Grandfather, Paternal Grandmother, Other    Other Cancer (1) Father            Current Outpatient Medications   Medication Sig     buPROPion (WELLBUTRIN XL) 300 MG 24 hr tablet TK 1 T PO QD     cetirizine (ZYRTEC) 10 MG tablet Take 1 tablet by mouth daily     Cholecalciferol (VITAMIN D3 PO) Take 2,000 Units by mouth daily      levothyroxine (SYNTHROID/LEVOTHROID) 112 MCG tablet TK 1 T PO QD     LUTEIN PO Take 1 tablet by mouth daily     mometasone (NASONEX) 50 MCG/ACT nasal spray Spray 2 sprays into both nostrils daily     multivitamin, therapeutic with minerals (MULTI-VITAMIN) TABS Take 1 tablet by mouth daily     olopatadine HCl (PATADAY) 0.2 % SOLN 1 drop as needed     omeprazole (PRILOSEC) 20 MG DR capsule Take 20 mg by mouth daily     VITAMIN E NATURAL PO Take by mouth daily      fluconazole (DIFLUCAN) 150 MG tablet Take 1 tablet (150 mg) by mouth every 3 days (Patient not taking: Reported on 2021)     predniSONE (DELTASONE) 10 MG tablet Take 10 mg by mouth     No current facility-administered medications for this visit.      Allergies   Allergen Reactions     Codeine      PN: LW Reaction: nausea/vomitting  Other reaction(s): *Unknown  PN: LW Reaction: nausea/vomitting       ROS:  12 point review  of systems negative other than symptoms noted below or in the HPI.  No urinary frequency or dysuria, bladder or kidney problems      OBJECTIVE:     /80   Wt 90.3 kg (199 lb)   Breastfeeding No   BMI 33.12 kg/m    Body mass index is 33.12 kg/m .    Exam:  Constitutional:  Appearance: Well nourished, well developed alert, in no acute distress  Gastrointestinal:  Abdominal Examination:  Abdomen nontender to palpation, tone normal without rigidity or guarding, no masses present, umbilicus without lesions; Liver/Spleen:  No hepatomegaly present, liver nontender to palpation; Hernias:  No hernias present  Lymphatic: Lymph Nodes:  No other lymphadenopathy present  Skin: General Inspection:  No rashes present, no lesions present, no areas of discoloration.  Neurologic:  Mental Status:  Oriented X3.  Normal strength and tone, sensory exam grossly normal, mentation intact and speech normal.    Psychiatric:  Mentation appears normal and affect normal/bright.  Pelvic Exam:  External Genitalia:     Normal appearance for age, no discharge present, no tenderness present, no inflammatory lesions present, color normal  Vagina:     Normal vaginal vault without central or paravaginal defects, no discharge present, no inflammatory lesions present, no masses present  Bladder: Minimal cystocele without loss of angle   Nontender to palpation  Urethra:   Urethral Body:  Urethra palpation normal, urethra structural support normal   Urethral Meatus:  No erythema or lesions present  Cervix:     Appearance healthy, no lesions present, nontender to palpation, no bleeding present  Uterus:     Uterus: firm, normal sized and nontender, midplane in position.   Adnexa:     No adnexal tenderness present, no adnexal masses present  Perineum:     Perineum within normal limits, no evidence of trauma, no rashes or skin lesions present  Anus: Minimal rectocele   Anus within normal limits, no hemorrhoids present  Inguinal Lymph Nodes:     No  lymphadenopathy present  Pubic Hair:     Normal pubic hair distribution for age  Genitalia and Groin:     No rashes present, no lesions present, no areas of discoloration, no masses present       In-Clinic Test Results:      ASSESSMENT/PLAN:                                                        ICD-10-CM    1. Cystocele, midline  N81.11      55-year-old with marked improvement of her pelvic floor tone by doing Kegel exercises.  She will continue this pattern.  She also needs to lose some weight and avoid heavy lifting.  I do not believe any surgical intervention is necessary at this time.        Yang Wick MD  [facility] [place]

## 2021-09-18 ENCOUNTER — HEALTH MAINTENANCE LETTER (OUTPATIENT)
Age: 56
End: 2021-09-18

## 2022-01-08 ENCOUNTER — HEALTH MAINTENANCE LETTER (OUTPATIENT)
Age: 57
End: 2022-01-08

## 2022-02-04 DIAGNOSIS — B37.31 YEAST INFECTION OF THE VAGINA: ICD-10-CM

## 2022-02-04 RX ORDER — FLUCONAZOLE 150 MG/1
TABLET ORAL
Qty: 2 TABLET | Refills: 3 | OUTPATIENT
Start: 2022-02-04

## 2022-02-04 NOTE — TELEPHONE ENCOUNTER
"Requested Prescriptions   Pending Prescriptions Disp Refills     fluconazole (DIFLUCAN) 150 MG tablet [Pharmacy Med Name: FLUCONAZOLE 150MG TABLETS] 2 tablet 3     Sig: TAKE 1 TABLET(150 MG) BY MOUTH EVERY 3 DAYS       Antifungal Agents Failed - 2/4/2022 12:35 PM        Failed - Not Fluconazole or Terconazole      If oral Fluconazole or Terconazole, may refill if indicated in progress notes.           Passed - Recent (12 mo) or future (30 days) visit within the authorizing provider's specialty     Patient has had an office visit with the authorizing provider or a provider within the authorizing providers department within the previous 12 mos or has a future within next 30 days. See \"Patient Info\" tab in inbasket, or \"Choose Columns\" in Meds & Orders section of the refill encounter.              Passed - Medication is active on med list           Last Written Prescription Date:  2/3/21  Last Fill Quantity: 2,  # refills: 3   Last office visit: 6/8/2021 with prescribing provider:  Dr Wick   Future Office Visit:  None  Refill declined  Pt should contact provider  Debbi Castellon RN on 2/4/2022 at 4:09 PM      "

## 2022-04-13 ENCOUNTER — TELEPHONE (OUTPATIENT)
Dept: OBGYN | Facility: CLINIC | Age: 57
End: 2022-04-13
Payer: COMMERCIAL

## 2022-04-13 NOTE — TELEPHONE ENCOUNTER
Patient asked if she could get a call from Roz SANTANA regarding getting a referral for a primary care doctor. Please call back.

## 2022-04-13 NOTE — TELEPHONE ENCOUNTER
Pt requesting a referral for PCP.  Routing pt [employer] message to provider to advise.  Va Yeung RN on 4/13/2022 at 12:30 PM

## 2022-05-31 ENCOUNTER — VIRTUAL VISIT (OUTPATIENT)
Dept: SURGERY | Facility: CLINIC | Age: 57
End: 2022-05-31
Payer: COMMERCIAL

## 2022-05-31 VITALS — HEIGHT: 65 IN | BODY MASS INDEX: 35.16 KG/M2 | WEIGHT: 211 LBS

## 2022-05-31 DIAGNOSIS — E66.812 CLASS 2 SEVERE OBESITY WITH BODY MASS INDEX (BMI) OF 35 TO 39.9 WITH SERIOUS COMORBIDITY (H): Primary | ICD-10-CM

## 2022-05-31 DIAGNOSIS — E66.01 CLASS 2 SEVERE OBESITY WITH BODY MASS INDEX (BMI) OF 35 TO 39.9 WITH SERIOUS COMORBIDITY (H): Primary | ICD-10-CM

## 2022-05-31 PROCEDURE — 99215 OFFICE O/P EST HI 40 MIN: CPT | Mod: GT | Performed by: PHYSICIAN ASSISTANT

## 2022-05-31 PROCEDURE — 99417 PROLNG OP E/M EACH 15 MIN: CPT | Mod: GT | Performed by: PHYSICIAN ASSISTANT

## 2022-05-31 RX ORDER — ESOMEPRAZOLE MAGNESIUM 40 MG/1
CAPSULE, DELAYED RELEASE ORAL
COMMUNITY
Start: 2022-05-15 | End: 2023-01-13

## 2022-05-31 NOTE — PROGRESS NOTES
"Linda is a 56 year old who is being evaluated via a billable video visit.      If the video visit is dropped, the invitation should be resent by: Text to cell phone: [phone]  Will anyone else be joining your video visit? No      Video-Visit Details    Type of service:  Video Visit    Video Start Time: 1:06 PM    Video End Time: 2:01 PM    Originating Location (pt. Location): Home    Distant Location (provider location):  [facility] [place]     Platform used for Video Visit: [facility]    PATIENT:  Linda Mina   MRN:         [id number]   :         1965  TOM:         2022      Dear Dano Stuart MD,    I had the pleasure of seeing your patient, Linda Mina. Full intake/assessment was done to determine barriers to weight loss success and develop a treatment plan. Linda Mina is a 56 year old female interested in treatment of medical problems associated with excess weight. She has a height of 5' 5\"[ptt reported[, a weight of 211 lbs 0 oz, and the calculated Body mass index is 35.11 kg/m .    Assessment & Plan   Problem List Items Addressed This Visit     Class 2 severe obesity with body mass index (BMI) of 35 to 39.9 with serious comorbidity (H) - Primary     2022 [facility] Initial Wt 211 Started Qsymia.  Has been successful with Phenfen in past. BP/P WNL.  Chose Qysmia because she can be at lower doses which may be less activating.  Helpful with current job stresses.              Relevant Medications    Phentermine-Topiramate 7.5-46 MG CP24           PROGRAM OVERVIEW  Reviewed options at [facility] including provider visits, dietician, 24 week healthy lifestyle program, health coaching, food supplements, Get Moving program, and psychological support.  All questions about weight loss program were answered.     MEDICATIONS:  We discussed healthy habits to assist with weight loss. We reviewed " "medications associated with weight gain. We discussed the role of pharmacological agents in the treatment of obesity and the \"off-label\" use of medications in this practice. We reviewed medication that may assist with weight loss. Indications, contraindications, risks/benefits, and potential side effects were discussed. Qsymia was prescribed. Discussed that medications must always be used together with lifestyle changes such as improvements in diet choices, portion control and establishing and maintaining a regular exercise program.    If too expensive will start with phentermine on its own.      STRESS:  Pt's stress is extremely high due to her current job.  She is aware of the hostile work environment and is actively seeking another position.   We did not tackle stress as part of her plan because it was unreachable at this point.  Will try to continue to review at each visit.     PT INSTRUCTIONS:  Start Qsymia 7.5/46 mg.  Take 1 tablet in the morning.   Call to schedule your dietician visit.  Check with insurance to see if Saxenda is covered.     Goals:  Start having breakfast within an hour of waking.  Have protein with each meal (goal about 20 grams a meal)    FOLLOW-UP:    Please call [phone] to schedule your next visit in 8-10 weeks.     72 minutes spent on the date of the encounter doing chart review, history and exam, review test results, counseling, developing plan of care, documentation, and further activities as noted above.       Weight Related Co-morbidities:          I have the following health issues associated with obesity:    I have the following symptoms associated with obesity:      Patient Active Problem List   Diagnosis     Status post bilateral knee replacements     Class 2 severe obesity with body mass index (BMI) of 35 to 39.9 with serious comorbidity (H)       Weight History    Previously had weight loss surgery: No   Age pt became overweight:   Early adulthood   The following factors " contributed to weight gain:    Pregnancy, genetics   I have tried the following methods to lose weight:   Phen-fen.  Kept weight off until she had children, MYLES.     Family hx of obesity: Yes   How has your weight changed over the last year?  Gained due to stressful job   Goal is to be healthy.  She would like to look in the mirror and remember herself.    She has been anorexic age [age].  Crept up and then used Phen fen and lost all the weight.  Was able to keep weight off until pregnancy in .  Job is extremely stressful and was sedentary.  Had double knee surgery.  Weight returned.      Diet Recall     Wake Up: 4:50 am   Breakfast Skips     Lunch 11:30-12:30 PM Salad or entree from cafe   Supper; 8-9 PM Protein, veggie   Bedtime:   Snack between meals:   Snack in evenin-9:30 PM    None   None      Typical snacks: None   Caloric beverages per day. What type: -   Water consumed daily: 100 oz    Low natalya/diet drinks:   Alcohol:      Foods you crave: -   Moderate( Drink once a week)  recovering alcoholic   Popcorn (may use as a meal replacement), has a lot of sugar available in the house.  Was snacking on this at night after dinner.   uses this as a replacement for his alcohol.          Sleep History    How many hours do you sleep at night? 9:30 PM   Do you think you have sleep apnea?  Yes, uses CPAP nightly.   Do you snore so loudly some one can hear you through a closed door?    Has anyone seen or heard you stop breathing during your sleep?     Do you often feel tired, fatigued, or sleepy during the day?    Do you have a TV/Computer in your bedroom?          Eating Habits (Yes/No) (Never, Daily, Several Days, Weekly,Monthly)   Healthy Eater Yes      Generally, eat a lot of Simple Carbs/processed boxed foods No   Generally, eat a lot of fatty foods No   Eat fast food  Weekly      Eat Take out/sit-down restaurant. Several times a week      Eat most meals in front of screens Yes 99% of time, if  office at breakfast, lunch, at night      Skip meals Yes   Grazes all day No      Snacks between meals. No      Eat most food at end of day. Yes      Eat in middle of night  No      Eat  to prevent or correct low blood sugar. No      Eat to prevent GERD No, on PPI      Worry about not having enough food to eat. No      Constant Dieter No      I emotionally eat. (stressed, depressed, anxious, bored) Yes      I feel hungry all the time-even if I just have eaten. No      Feeling full is important to me. No      I finish all the food on my plate-even if I am already full. No      I eat/snack without noticing that I am eating. Yes      I eat when I am preparing the meal. Yes      I have trouble not eating junk if they are around the house. Yes      I think about food all day. No      Who does the grocery shopping?    Who does the cooking?        Eating Disorder Screen    I binge eat No      I make myself vomit what I have eaten or use laxatives to get rid of food.    I eat a large amount of food, like a loaf of bread, a box of cookies, a pint/quart of ice cream, all at once.    I eat a large amount of food even when I am not hungry.    I eat rapidly.    I eat alone because I feel embarrassed and do not want others to see how much I have eaten.    I eat until I am uncomfortably full.    I feel bad, disgusted, or guilty after I overeat.    I make myself vomit what I have eaten or use laxatives to get rid of food.        Activity/Exercise History    How much of a typical 12 hour day do you spend sitting or lying down?    How many hours of screen time do you have daily?    How many times a week are you active for the purpose of exercise?    What do you do for exercise?    For how long to your exercise for?    What keeps you from being more active?        Past Medical History:   Diagnosis Date     Carpal tunnel syndrome of right wrist      H/O breast augmentation      H/O migraine      H/O tubal ligation      Hypothyroidism       S/P bunionectomy      Seborrheic dermatitis      Shoulder pain      Sleep apnea      Status post endometrial ablation      Synovial cyst of popliteal space      Vitamin D deficiency      [place] teeth extracted           Work/Social History Reviewed With Patient    My employment status is: Full time, Gets 10-12 hours   My job is: [employer], director of global transportation.  Not a sustainable healthy environment   How much of your job is spent on the computer or phone? 100% zoom the majority of the day.     How many hours do you spend commuting to work daily?     What is your marital status?    If in a relationship, is your significant other overweight?    Do you have children? 2 Youngest is 19 and still living at home   If you have children, are they overweight?    Who do you live with?   and youngest son    Are they supportive of your health goals?    Who does the food shopping?       Social History     Tobacco Use     Smoking status: Former Smoker     Types: Cigarettes     Quit date: 2001     Years since quittin.5     Smokeless tobacco: Never Used   Substance Use Topics     Alcohol use: Yes     Comment: 2 glasses of wine 3 times a week     Drug use: No        Mental Health History Reviewed With Patient    How often in the past 2 weeks have you felt little interest or pleasure in doing things?    Over the past 2 weeks how often have you felt down, depressed, or hopeless?    Does your mental health effect your weight or vice versa?    How do you handle stress?  What coping techniques do you use? Walking meeting.     Do you see a therapist?      Would you like to be connected with a therapist?        MEDICATIONS:   Current Outpatient Medications   Medication     buPROPion (WELLBUTRIN XL) 300 MG 24 hr tablet     cetirizine (ZYRTEC) 10 MG tablet     Cholecalciferol (VITAMIN D3 PO)     esomeprazole (NEXIUM) 40 MG DR capsule     fluconazole (DIFLUCAN) 150 MG tablet     levothyroxine  (SYNTHROID/LEVOTHROID) 112 MCG tablet     LUTEIN PO     mometasone (NASONEX) 50 MCG/ACT nasal spray     multivitamin w/minerals (THERA-VIT-M) tablet     Phentermine-Topiramate 7.5-46 MG CP24     VITAMIN E NATURAL PO     predniSONE (DELTASONE) 10 MG tablet     No current facility-administered medications for this visit.        ALLERGIES:      Allergies   Allergen Reactions     Codeine      PN: LW Reaction: nausea/vomitting  Other reaction(s): *Unknown  PN: LW Reaction: nausea/vomitting          BP Readings from Last 6 Encounters:   06/08/21 124/80   02/08/21 130/78   02/01/21 116/64   10/19/20 118/74   09/28/20 120/74   09/25/19 120/80    Anxiety is high right now    ROS:    HEENT  H/O glaucoma:  no  Cardiovascular  CAD:   no  Palpitations:   No, Echo WNL following use of Phen-fen.    HTN:    no  Gastrointestinal  GERD:   no  Constipation:   no  Gastroparesis:  no  Liver Dz:   no  H/O Pancreatitis:  no  H/O Gallbladder Dz: no  Psychiatric  Moods Stable:  yes  Anxiety:   yes  Depression:  yes  Bipolar:  no  H/O ETOH/Drug Use no  H/O eating disorder: yes  Endocrine  PMH/FMH of MTC or MEN2:  no  Neurologic:  H/O seizures:   no  Headaches:  Yes, migraine headaches once a year  Memory Impairment:  no    H/O kidney stones:  no  Kidney disease:  no  Current birth control: Menopause    Labs/Records Reviewed:  Sodium   Date Value Ref Range Status   11/11/2013 138 133 - 144 mmol/L Final     Potassium   Date Value Ref Range Status   11/11/2013 3.7 3.4 - 5.3 mmol/L Final     Chloride   Date Value Ref Range Status   11/11/2013 103 94 - 109 mmol/L Final     Carbon Dioxide   Date Value Ref Range Status   11/11/2013 30 20 - 32 mmol/L Final     Anion Gap   Date Value Ref Range Status   11/11/2013 5 (L) 6 - 17 mmol/L Final     Glucose   Date Value Ref Range Status   11/13/2013 123 (H) 60 - 99 mg/dL Final     Urea Nitrogen   Date Value Ref Range Status   11/11/2013 13 5 - 24 mg/dL Final     Creatinine   Date Value Ref Range Status  "  11/11/2013 0.64 0.52 - 1.04 mg/dL Final     GFR Estimate   Date Value Ref Range Status   11/11/2013 >90 >60 mL/min/1.7m2 Final     Calcium   Date Value Ref Range Status   11/11/2013 8.8 8.5 - 10.4 mg/dL Final     WBC   Date Value Ref Range Status   11/12/2013 12.3 (H) 4.0 - 11.0 10e9/L Final     Hemoglobin   Date Value Ref Range Status   11/13/2013 11.1 (L) 11.7 - 15.7 g/dL Final     Hematocrit   Date Value Ref Range Status   11/12/2013 33.2 (L) 35.0 - 47.0 % Final     MCV   Date Value Ref Range Status   11/12/2013 93 78 - 100 fl Final     Platelet Count   Date Value Ref Range Status   11/14/2013 173 150 - 450 10e9/L Final        PHYSICAL EXAM:  Ht 5' 9\" (1.753 m)   Wt 195 lb (88.5 kg)   BMI 28.80 kg/m    GENERAL: Healthy, alert and no distress  EYES: Eyes grossly normal to inspection.  No discharge or erythema, or obvious scleral/conjunctival abnormalities.  RESP: No audible wheeze, cough, or visible cyanosis.  No visible retractions or increased work of breathing.    SKIN: Visible skin clear. No significant rash, abnormal pigmentation or lesions.  NEURO: Cranial nerves grossly intact.  Mentation and speech appropriate for age.  PSYCH: Mentation appears normal, affect normal/bright, judgement and insight intact, normal speech and appearance well-groomed.    COUNSELING:   Reviewed obesity as a chronic disease and comprehensive management stratagies.      We discussed Bariatric Basics including:  -eating 3 meals daily  -eating protein first  -eating slowly, chewing food well  -avoiding/limiting calorie containing beverages  -limiting carbohydrates and changing to whole grains  -limiting restaurant or cafeteria eating to twice a week or less    We discussed the importance of restorative sleep and stress management in maintaining a healthy weight.  We discussed insulin resistance and glycemic index as it relates to appetite and weight control.   We discussed the importance of physical activity including " cardiovascular and strength training in maintaining a healthier weight and explored viable options.  Patient education of above written in AVS.      Sincerely,      Carmela Bronson PA-C

## 2022-05-31 NOTE — PATIENT INSTRUCTIONS
Nice to talk with you today.  Below is our plan we discussed.-  LIZZ Casey    Plan:  Start Qsymia 7.5/46 mg.  Take 1 tablet in the morning.   Call to schedule your dietician visit.  Check with insurance to see if Maritza is covered.     Goals:  Start having breakfast within an hour of waking.  Have protein with each meal (goal about 20 grams a meal)    FOLLOW-UP:    Please call [phone] to schedule your next visit in 8-10 weeks.      MEDICATION STARTED AT THIS APPOINTMENT    We are starting Qsymia. This is a specific obesity medication and is a combination of Phentermine and Topiramate, formulated as a sustained release, taken one time a day. There are a few different doses of the medication. Doses come in 3.75/23 mg (usually skipped), 7.5/46 mg, 11.25/69 mg, and 15/92 mg. Contact the nurse via Contacts+ or call [phone] if you have any questions or concerns. (Do not stop taking it if you don't think it's working. For some people it works even though they do not feel much different.)  Please get blood pressure and pulse checked 1-2 weeks after starting phentermine. If BP above 140/90 or pulse is greater than 100 will contact clinic.     For some of our patients, the pills work right away. They feel and think quite differently about food. Other patients don't feel much of a change but find in fact they have lost weight! Like all weight loss medications, Qsymia works best when you help it work.  This means:    1) Have less tempting high calorie (fattening) food around the house or office    2) Have lower calorie food (fruits, vegetables,low fat meats and dairy) for snacks    3) Eat out only one time or less each week.   4) Eat your meals at a table with the TV or computer off.    Side-effects (generally well tolerated because it is a sustained release medication)    Topiramate:   -Tingling in hands,feet, or face (usually not very troublesome)   -Mental confusion and word finding trouble (about 10% of patients  have this.)     -Feeling sleepy or a bit dopey- this goes away very soon after starting.      Phentermine:    -Feelings of racing pulse or rapid heart beat.    -Increased anxiety.   -Some people can get an elevated blood pressure. Because of this we may have  you  come back within a week or so of starting the medication for a blood pressure check.    One of the dangers of topiramate is the possibility of birth defects--if you get pregnant when you are on it, there is the risk that your baby will be born with a cleft lip or palate.  If you are on topiramate and of child bearing age, you need to be on a reliable form of birth control or refrain from sexual intercourse.     It is very rare for insurance to pay for this and it typically costs around $200 per month. Please check out the website www.qsymia.com and sign up for the Pharmacy savings program to save $75 a month for 12 months if eligible. The medication can also be paid for out of pocket. It may require a prior authorization which could take up to 1-2 weeks.      In order to get refills of this or any medication we prescribe you must be seen in the medical weight mgmt clinic every 2-4 months.    10 Healthy Habits  Eat Better ? Move More ? Live Well   Eat breakfast daily.     Try to eat within the first hour after you wake up. This helps you control your appetite during the day, and you are less likely to snack in the evening.    80% of people who skip meals are overweight or obese.    2.   Include protein with every meal, even breakfast.    Protein will suppress your appetite longer than other types of food. This helps you  feel more satisfied with the amount of food you have eaten.    3.  Fill up on Fiber   Fiber comes from plants--fruits, veggies, whole grains, nuts/seeds and beans   Fiber is low in calories, high in phytonutrients and helps you stay full longer    4.  Eat S-L-O-W-L-Y   Take 20-30 minutes to eat each meal by taking small bites, chewing foods  to applesauce consistency or 20-30 times before you swallow   Eating foods too fast can delay satiety/fullness signals and increase overeating     5.  Avoid Mindless Eating   Be present when you eat--take note of the smell, taste and quality of your food   Make a list of alternative activities you could do to prevent boredom/stress eating  Go for a walk, call a friend, chew gum, paint your nails    6.  Keep a Journal   Writing down what you eat, how you feel and when you are active helps you identify new changes to work on from week to week         Look for ways to cut 100 calories from your current diet 2-3 times per day    7.  Drink 64 ounces of Non Calorie drinks between meals   Water   Zero calorie Propel , Vitamin Water , Crystal Light    Avoid regular soda pop    8.  Stop thinking about food choices as a  diet.    Instead, think of them as healthy, lifelong habits--an effort toward a new way of eating.   Weigh yourself once a week instead of everyday.     9.  Get enough sleep--at least 7 to 8 hours each night.    Lack of sleep is one reason that fat builds up around the waist.    10.  Exercise five to six times per week    Do a combination aerobic exercise (fast walking, biking, swimming) and strength training (Dumbbells, pushups, weight machines, resistance bands).   Start at 10 minutes per day and slowly work your way up to 30 minutes or more.  Taking the stairs instead of the elevator, park at the far end of the parking lot, pace while on the phone, take the dog for a walk.     [email or web address]

## 2022-05-31 NOTE — ASSESSMENT & PLAN NOTE
5/31/2022 [facility] Initial Wt 211 Started Qsymia.  Has been successful with Phenfen in past. BP/P WNL.  Chose Qysmia because she can be at lower doses which may be less activating.  Helpful with current job stresses.

## 2022-06-13 NOTE — PROGRESS NOTES
"Video-Visit Details    Type of service:  Video Visit    Video Start Time (time video started): 3:03    Video End Time (time video stopped): 3:29    Originating Location (pt. Location): home    Distant Location (provider location):  [facility] [place]     Mode of Communication:  Video Conference via [facility] WEIGHT LOSS INITIAL EVALUATION  DIAGNOSIS:  Class II Obesity    NUTRITION HISTORY:    Diet Recall      Wake Up: 4:50 am   Breakfast Skips  -has tried greek yogurt with fruit   Lunch 11:30-12:30 PM skips some days or Salad or entree from cafe   Supper; 7 pmProtein, veggie   Bedtime:   Snack between meals:   Snack in evenin-9:30 PM    6 roasted almonds   None      Typical snacks: None   Caloric beverages per day. What type: -   Water consumed daily: 100 oz    Low natalya/diet drinks:   Alcohol:       Foods you crave: -   Moderate (Drink once a week)  recovering alcoholic   Popcorn (may use as a meal replacement), has a lot of sugar available in the house.  Was snacking on this at night after dinner.   uses this as a replacement for his alcohol.          Dining out: 1 x per week/ cafe one time per week at work  Binge eating: no  Emotional eating: yes-stress, reward / mindless  Night time eating: no  Exercise: no  Other: Patient apologized for forgetting about appointment today. Spouse cooks and shops for groceries. She is at \"an all time high\" for stress. Working 18 hour days and suspects she is about to lose her job. Encouraged patient to work with at therapist on stress management and advised checking with her insurance for optimal coverage. She indicated therapist are  booking into August. Suggested scheduling for August.     She describes herself as a fussy eater- certain textures do not go well. Has done a 3 day work shop on mindfulness. Patient has been on Qsmia < 7 days and has noticed it is increasing her thirst. Does not like protein drinks, " "but will try protein water.     MEDICATION FOR WEIGHT LOSS:  Qsimia    ANTHROPOMETRICS:  Height: 65\"   Weight: 211 lb on 5/31 visit with provider  BMI:  35.11 kg/m2  NUTRITION DIAGNOSIS:   Obese, class II related to excess energy intake as evidence by BMI of  35.11  NUTRITION INTERVENTIONS  Nutrition Prescription:  Recommend modified energy- nutrient intake  Implementation:  Nutrition Education (Content):    Discussed my plate guidelines    Determined alternative to emotional eating    Reviewed Mindful Eating/ discussed eating more to help increase metabolism    Provided: My plate guidelines, Mindful eating, Tips for Weight Loss and Weight Maintenance    Nutrition Education (Application):     Patient to practice goals as stated below    Patient verbalizes understanding of weight management by wanting to try protein water     Anticipate fair compliance    Goals:  Focus on alternatives to emotional eating (find a hobby, music, medication, spend time with dogs)  Aim to have protein at each meal + 2 other food groups  Continue to work on having breakfast daily    FOLLOW UP AND MONITORING:    Other  - follow up in 8 weeks.     TIME SPENT WITH PATIENT:   26 minutes   Von Dale RD, ALANA  [facility], [place]    "

## 2022-06-14 ENCOUNTER — VIRTUAL VISIT (OUTPATIENT)
Dept: SURGERY | Facility: CLINIC | Age: 57
End: 2022-06-14
Payer: COMMERCIAL

## 2022-06-14 VITALS — BODY MASS INDEX: 35.28 KG/M2 | WEIGHT: 212 LBS

## 2022-06-14 DIAGNOSIS — E66.812 CLASS 2 SEVERE OBESITY WITH BODY MASS INDEX (BMI) OF 35 TO 39.9 WITH SERIOUS COMORBIDITY (H): ICD-10-CM

## 2022-06-14 DIAGNOSIS — E66.01 CLASS 2 SEVERE OBESITY WITH BODY MASS INDEX (BMI) OF 35 TO 39.9 WITH SERIOUS COMORBIDITY (H): ICD-10-CM

## 2022-06-14 PROCEDURE — 97802 MEDICAL NUTRITION INDIV IN: CPT | Mod: GT | Performed by: DIETITIAN, REGISTERED

## 2022-06-14 NOTE — PATIENT INSTRUCTIONS
Finesse Mckeon-  Welcome to the [facility], [place]! It was great to visit with you and learn about  your interest in weight loss. Below are the goals we discussed.    Goals:  Focus on alternatives to emotional eating (find a hobby, music, medication, spend time with dogs)  Aim to have protein at each meal + 2 other food groups  Continue to work on having breakfast daily    Nutrition Educational Materials:  Mindful Eating  [email or web address]     Tips for Weight Loss and Weight Management  [email or web address]    Create a Plate (How to Build a Healthy Meal)  [email or web address]      Please call [phone] to schedule your next  visit with a Dietitian in about 2 months.  Thanks!  Von Dale RD, LD  [facility], [place]

## 2022-07-26 ENCOUNTER — VIRTUAL VISIT (OUTPATIENT)
Dept: SURGERY | Facility: CLINIC | Age: 57
End: 2022-07-26
Payer: COMMERCIAL

## 2022-07-26 VITALS — WEIGHT: 201.4 LBS | HEIGHT: 65 IN | BODY MASS INDEX: 33.55 KG/M2 | HEART RATE: 86 BPM

## 2022-07-26 DIAGNOSIS — E66.09 CLASS 1 OBESITY DUE TO EXCESS CALORIES WITHOUT SERIOUS COMORBIDITY WITH BODY MASS INDEX (BMI) OF 33.0 TO 33.9 IN ADULT: Primary | ICD-10-CM

## 2022-07-26 DIAGNOSIS — E66.01 CLASS 2 SEVERE OBESITY WITH BODY MASS INDEX (BMI) OF 35 TO 39.9 WITH SERIOUS COMORBIDITY (H): ICD-10-CM

## 2022-07-26 DIAGNOSIS — E66.811 CLASS 1 OBESITY DUE TO EXCESS CALORIES WITHOUT SERIOUS COMORBIDITY WITH BODY MASS INDEX (BMI) OF 33.0 TO 33.9 IN ADULT: Primary | ICD-10-CM

## 2022-07-26 DIAGNOSIS — E66.812 CLASS 2 SEVERE OBESITY WITH BODY MASS INDEX (BMI) OF 35 TO 39.9 WITH SERIOUS COMORBIDITY (H): ICD-10-CM

## 2022-07-26 PROCEDURE — 99215 OFFICE O/P EST HI 40 MIN: CPT | Mod: GT | Performed by: PHYSICIAN ASSISTANT

## 2022-07-26 NOTE — ASSESSMENT & PLAN NOTE
Patient was congratulated on wt loss success thus far. Healthy habits to assist with further weight loss were discussed. Linda will continue the qsymia.  She will continue to stop eating when satisfied and stay firm when he  offers her seconds and snacks.

## 2022-07-26 NOTE — PROGRESS NOTES
iLnda is a 57 year old who is being evaluated via a billable video visit.      If the video visit is dropped, the invitation should be resent by: Text to cell phone: [phone]  Will anyone else be joining your video visit? No      Video-Visit Details    Type of service:  Video Visit    Video Start Time: 8:33 AM    Video End Time: 8:56 AM    Originating Location (pt. Location): Home    Distant Location (provider location):  [facility] [place]     Platform used for Video Visit: [employer]     2022    Return Medical Weight Management Note     Linda Mina  MRN:  [id number]  :  1965      Dear Dano Stuart,    I had the pleasure of doing a visit with your patient Linda Mina.  She is a 57 year old female who I am continuing to see for treatment of obesity related to:    No flowsheet data found.    Assessment & Plan   Problem List Items Addressed This Visit     Class 1 obesity due to excess calories without serious comorbidity with body mass index (BMI) of 33.0 to 33.9 in adult - Primary     Patient was congratulated on wt loss success thus far. Healthy habits to assist with further weight loss were discussed. Linda will continue the qsymia.  She will continue to stop eating when satisfied and stay firm when he  offers her seconds and snacks.               Relevant Medications    Phentermine-Topiramate 7.5-46 MG CP24      Other Visit Diagnoses     Class 2 severe obesity with body mass index (BMI) of 35 to 39.9 with serious comorbidity (H)        Relevant Medications    Phentermine-Topiramate 7.5-46 MG CP24           PATIENT INSTRUCTIONS:  Continue Qysmia.  Have BP checked at Pre-op for surgery.  If greater that 140/90 please contact clinic.   Let surgeon know you are on Qsymia to see if they want to stop this before your procedure.     Goals:  Continue to stop eating when your body cues you that you are satisfied.    Stay firm when your  offers you  seconds or snacks and you are not hungry.  Continue looking for a new role that values you at [employer].    FOLLOW-UP:    Please call [phone] to schedule your next visit with the dietician in 1 month and with myself in 8-10 weeks.     40 minutes spent on the date of the encounter doing chart review, history and exam, result review, counseling, developing plan of care, documentation, and further activities as noted      INTERVAL HX:Linda returns for medical weight management follow up.  Pt was last seen for [facility] initial evaluation evaluation.  Was started on qysemia.   Is not hungry in between meals.  Has little appetite.  Portions are controlled.  Is eating half her portions now at meals.     Has surgery coming up at [place]. Will get BP checked there.       WEIGHT METRICS:  Body mass index is 33.51 kg/m .   Current Weight: 201 lb 6.4 oz (91.4 kg) (pt reproted)  Last Visits Weight: 212 lb (96.2 kg)  Initial Weight (lbs): 211 lbs  Cumulative weight loss (lbs): 9.6  Weight Loss Percentage: 4.55%      BP Readings from Last 6 Encounters:   06/08/21 124/80   02/08/21 130/78   02/01/21 116/64   10/19/20 118/74   09/28/20 120/74   09/25/19 120/80       Wt Readings from Last 10 Encounters:   07/26/22 201 lb 6.4 oz (91.4 kg)   06/14/22 212 lb (96.2 kg)   05/31/22 211 lb (95.7 kg)   06/08/21 199 lb (90.3 kg)   02/08/21 197 lb 6.4 oz (89.5 kg)   02/01/21 196 lb (88.9 kg)   10/19/20 205 lb (93 kg)   09/28/20 203 lb (92.1 kg)   09/25/19 214 lb 9.6 oz (97.3 kg)   09/03/19 214 lb (97.1 kg)       Weight Loss Medication (AOM) History Reviewed       Current weight loss medication/s taking:    qsymia   If not taking an AOM prescribed to you, please indicate why: -   Any side effects from the medication?   increased thirst       Changes and Difficulties      Diet changes since last visit:   Stop eating when she is full. Is able to say know to  when he asks her to snack or have a second portion.     With regards to diet, pt  "still struggling with:      Activity changes since last visit:   Energy is up, so she is more active.  Her I watch is a lot happier with her.     With regards to activity, pt still struggling with:      Had the worst review in her life at work.  So she  Is going to be looking for a new role.  She works with a  and as soon as she gets in her new role she will get a new    MEDICATIONS:   Current Outpatient Medications   Medication     buPROPion (WELLBUTRIN XL) 300 MG 24 hr tablet     cetirizine (ZYRTEC) 10 MG tablet     Cholecalciferol (VITAMIN D3 PO)     esomeprazole (NEXIUM) 40 MG DR capsule     fluconazole (DIFLUCAN) 150 MG tablet     levothyroxine (SYNTHROID/LEVOTHROID) 112 MCG tablet     LUTEIN PO     mometasone (NASONEX) 50 MCG/ACT nasal spray     multivitamin w/minerals (THERA-VIT-M) tablet     Phentermine-Topiramate 7.5-46 MG CP24     VITAMIN E NATURAL PO     predniSONE (DELTASONE) 10 MG tablet     No current facility-administered medications for this visit.       LABS:  Sodium   Date Value Ref Range Status   11/11/2013 138 133 - 144 mmol/L Final     Potassium   Date Value Ref Range Status   11/11/2013 3.7 3.4 - 5.3 mmol/L Final     Chloride   Date Value Ref Range Status   11/11/2013 103 94 - 109 mmol/L Final     Carbon Dioxide   Date Value Ref Range Status   11/11/2013 30 20 - 32 mmol/L Final     Anion Gap   Date Value Ref Range Status   11/11/2013 5 (L) 6 - 17 mmol/L Final     Glucose   Date Value Ref Range Status   11/13/2013 123 (H) 60 - 99 mg/dL Final     Urea Nitrogen   Date Value Ref Range Status   11/11/2013 13 5 - 24 mg/dL Final     Creatinine   Date Value Ref Range Status   11/11/2013 0.64 0.52 - 1.04 mg/dL Final     GFR Estimate   Date Value Ref Range Status   11/11/2013 >90 >60 mL/min/1.7m2 Final     Calcium   Date Value Ref Range Status   11/11/2013 8.8 8.5 - 10.4 mg/dL Final        PE:  Pulse 86   Ht 5' 5\" (1.651 m)   Wt 201 lb 6.4 oz (91.4 kg)   BMI 33.51 kg/m    GENERAL: " Healthy, alert and no distress  EYES: Eyes grossly normal to inspection.  No discharge or erythema, or obvious scleral/conjunctival abnormalities.  RESP: No audible wheeze, cough, or visible cyanosis.  No visible retractions or increased work of breathing.    SKIN: Visible skin clear. No significant rash, abnormal pigmentation or lesions.  NEURO: Cranial nerves grossly intact.  Mentation and speech appropriate for age.  PSYCH: Mentation appears normal, affect normal/bright, judgement and insight intact, normal speech and appearance well-groomed.    Sincerely,      Carmela Bronson PA-C

## 2022-07-26 NOTE — PATIENT INSTRUCTIONS
"Nice to talk with you today. Thank you for allowing me the privilege of caring for you. We hope we provided you with the excellent service you deserve.     To ensure the quality of our services you may receive a patient satisfaction survey from an independent monitoring company.  The greatest compliment you can give is \"Likely to Recommend\"    Below is our plan we discussed.-  LIZZ Casey      Plan:  Continue Qysmia.  Have BP checked at Pre-op for surgery.  If greater that 140/90 please contact clinic.   Let surgeon know you are on Qsymia to see if they want to stop this before your procedure.     Goals:  Continue to stop eating when your body cues you that you are satisfied.    Stay firm when your  offers you seconds or snacks and you are not hungry.  Continue looking for a new role that values you at [employer].    FOLLOW-UP:    Please call [phone] to schedule your next visit with the dietician in 1 month and with myself in 8-10 weeks.     "

## 2022-08-20 ENCOUNTER — HEALTH MAINTENANCE LETTER (OUTPATIENT)
Age: 57
End: 2022-08-20

## 2022-09-07 NOTE — PROGRESS NOTES
"Video-Visit Details    Type of service:  Video Visit    Video Start Time (time video started): 12:58    Video End Time (time video stopped): 1:17    Originating Location (pt. Location): Home    Distant Location (provider location):  [facility] [place]/[facility] location     Mode of Communication:  Video Conference via [facility] WEIGHT LOSS FOLLOW UP      DIAGNOSIS:  Class I Obesity    NUTRITION HISTORY:    Breakfast: yogurt with berries or leftover meat from dinner     Lunch:  Skips 2-3X per week or [place] salad or humus on pretzel chips or carrots or      Dinner:  Chicken or beef or pork, roasted veggie, sometimes roasted potatoes     Snacks:  rare     Beverage Choices:  96 oz water, 1/2 cup coffee, no ETOH     Exercise: none due to recent surgery- can not swim     Other: Still struggling with lots of stress, but it is improving. Had surgery over the summer (8/4/22)  and was less active for 7 weeks. Today is first day back at work after 7 weeks. She has only lost ~1 pound in the past 1 month. Suspect minimal weigh t loss due to inactivity due to surgery.    ANTHROPOMETRICS:    Initial Weight: 215 pounds    Height: 65\"    Current Weight:  197 pounds    Weight Change:  decrease 14 pounds    BMI: 32.78 kg/m2    MEDICATIONS:  Qsimia    EVALUATION/PROGRESS TOWARDS GOALS:  Previous Goals:  Focus on alternatives to emotional eating (find a hobby, music, meditation, spend time with dogs)-improving  Aim to have protein at each meal + 2 other food groups-not met  Continue to work on having breakfast daily-met    Previous Nutrition Diagnosis:  Obese class II related to excess energy intake as evidence by BMI of 35.11 kg/m2     Current Nutrition Diagnosis:   Obese class II related to excess energy intake as evidence by BMI of 32.78 kg/m2  No change      INTERVENTION:    Nutrition Prescription:  Recommend modified nutrient intake by decreasing energy " intake    Implementation:    Meals and Snacks: 2-3/0    Nutrition Education (Content):    Discussed previous goals and determined new goals    Encourage physical activity             Discussed Guidelines for Volumetric eating-will send information via [employer]    Supported patient in attempted weight loss and behavior changes    Congratulated patient on successful weight loss     Patient verbalizes understanding of weight loss by wanting to try some exercise    Anticipate fair-good compliance    Goals:  Eat lunch daily or have protein drink  Criteria for selecting a protein drink/8-12 ounces:  < 250 calories  15-30 grams protein  < 10 grams total fat  < 10 grams sugar  Try seated/ chair exercises 2-3X per week (video)  Continue to work on having protein + 2 other food group at each meal    Follow Up/Monitoring:  Other  -  patient to follow up in 8 weeks    Time Spent With Patient:  19 Minutes  Von Dale RD, LD  [facility]

## 2022-09-20 ENCOUNTER — VIRTUAL VISIT (OUTPATIENT)
Dept: SURGERY | Facility: CLINIC | Age: 57
End: 2022-09-20
Payer: COMMERCIAL

## 2022-09-20 VITALS — BODY MASS INDEX: 32.78 KG/M2 | WEIGHT: 197 LBS

## 2022-09-20 DIAGNOSIS — E66.09 CLASS 1 OBESITY DUE TO EXCESS CALORIES WITHOUT SERIOUS COMORBIDITY WITH BODY MASS INDEX (BMI) OF 33.0 TO 33.9 IN ADULT: ICD-10-CM

## 2022-09-20 DIAGNOSIS — E66.811 CLASS 1 OBESITY DUE TO EXCESS CALORIES WITHOUT SERIOUS COMORBIDITY WITH BODY MASS INDEX (BMI) OF 33.0 TO 33.9 IN ADULT: ICD-10-CM

## 2022-09-20 PROCEDURE — 97803 MED NUTRITION INDIV SUBSEQ: CPT | Mod: GT | Performed by: DIETITIAN, REGISTERED

## 2022-09-20 NOTE — PATIENT INSTRUCTIONS
Finesse Mckeon-   It was great to visit with you and learn about your progress. Below are the goals we discussed.  Goals:  Eat lunch daily or have protein drink  Criteria for selecting a protein drink/8-12 ounces:  < 250 calories  15-30 grams protein  < 10 grams total fat  < 10 grams sugar  Try seated/ chair exercises 2-3X per week (video)  Continue to work on having protein + 2 other food group at each meal    Nutrition Educational Materials:  Summary of [facility] [employer] Eating Plan  [email or web address]        Please call [phone] to schedule your next visit with a Dietitian in 2 months  Thanks!  Von Dale RD, LD  [facility]

## 2022-10-16 NOTE — PROGRESS NOTES
Linda is a 57 year old who is being evaluated via a billable video visit.      If the video visit is dropped, the invitation should be resent by: Text to cell phone: [phone]  Will anyone else be joining your video visit? No      Video-Visit Details    Type of service:  Video Visit    Video Start Time: 9:36 AM    Video End Time:10:06 AM        Originating Location (pt. Location): Home    Distant Location (provider location):  [facility] [place]     Platform used for Video Visit: [employer]      10/25/2022    Return Medical Weight Management Note     Linda Mina  MRN:  [id number]  :  1965      Dear Dano Stuart,    I had the pleasure of doing a visit with your patient Linda Mina.  She is a 57 year old female who I am continuing to see for treatment of obesity related to:    No flowsheet data found.    Assessment & Plan   Problem List Items Addressed This Visit     Class 1 obesity due to excess calories without serious comorbidity with body mass index (BMI) of 32.0 to 32.9 in adult     Patient was congratulated on wt loss success thus far. Healthy habits to assist with further weight loss were discussed. Linda will continue the qysmia but decrease her dose to see if this helps with dry mouth.            Relevant Medications    Phentermine-Topiramate (QSYMIA) 7.5-46 MG CP24    Gastroesophageal reflux disease, unspecified whether esophagitis present - Primary     Increase Nexium to 40 mg twice daily.  Will mychart in 2 weeks to give update.  Could add Famotidine.  Ordered EGD to be coordinated with colonscopy in 2022.          Relevant Orders    Adult GI  Referral - Procedure Only    Dry mouth     Due to Qysmia.  Decreased dose today.              PATIENT INSTRUCTIONS:  Increase Nexium to twice daily.   Mychart in 2 weeks to give update.  Could add Famotidine if needed at that time.    Ordered EGD to be coordinated with colonscopy in 2022.    Decrease Qsymia to 7.5/46 mg dose to see if this helps with dry month    FOLLOW-UP:  Please call [phone] to schedule your next visit in 3 months.     33 minutes spent on the date of the encounter doing chart review, history and exam, result review, counseling, developing plan of care, documentation, and further activities as noted      INTERVAL HX: Linda returns for medical weight management follow up.  Last seen in July.  Is on Qysmia. Had surgery at [place].  Feels she was stagnant In her weight loss and recenly decreased her.  On the medication she has dry mouth.  Drinks 20 oz of water overnight.  Sometimes she has to make herself eat. Felt the medication worked better in the beginning.  Now not as much.      Goals:  Continue to stop eating when your body cues you that you are satisfied.  - completed  Stay firm when your  offers you seconds or snacks and you are not hungry. -- completed.  Doing well.    Continue looking for a new role that values you at [employer].    Pts GERD is so bad  can almost not make food for pt anymore.  Is on Nexium 40 mg daily.  Has changed her diet and this plus the medication is helping.  Also cut out alcohol.  Still has symptoms daily.      WEIGHT METRICS:  Body mass index is 32.45 kg/m .   Current Weight: 195 lb (88.5 kg) (pt reported)  Last Visits Weight: 201 lb 6.4 oz (91.4 kg)  Initial Weight (lbs): 211 lbs  Cumulative weight loss (lbs): 16  Weight Loss Percentage: 7.58%    Wt Readings from Last 10 Encounters:   10/25/22 195 lb (88.5 kg)   09/20/22 197 lb (89.4 kg)   07/26/22 201 lb 6.4 oz (91.4 kg)   06/14/22 212 lb (96.2 kg)   05/31/22 211 lb (95.7 kg)   06/08/21 199 lb (90.3 kg)   02/08/21 197 lb 6.4 oz (89.5 kg)   02/01/21 196 lb (88.9 kg)   10/19/20 205 lb (93 kg)   09/28/20 203 lb (92.1 kg)      Weight Loss Medication (AOM) History Reviewed       Current weight loss medication/s taking:    Qysemia   If not taking an AOM prescribed to you, please indicate  why:    Any side effects from the medication?   Severe dry mouth       Changes and Difficulties      Diet changes since last visit:   Eliminating foods that contribute to GERD.     With regards to diet, pt still struggling with:   Trying to finds food she can eat that doesn't cause        MEDICATIONS:   Current Outpatient Medications   Medication     buPROPion (WELLBUTRIN XL) 300 MG 24 hr tablet     cetirizine (ZYRTEC) 10 MG tablet     Cholecalciferol (VITAMIN D3 PO)     esomeprazole (NEXIUM) 40 MG DR capsule     fluconazole (DIFLUCAN) 150 MG tablet     levothyroxine (SYNTHROID/LEVOTHROID) 112 MCG tablet     LUTEIN PO     mometasone (NASONEX) 50 MCG/ACT nasal spray     multivitamin w/minerals (THERA-VIT-M) tablet     Phentermine-Topiramate (QSYMIA) 15-92 MG CP24     Phentermine-Topiramate (QSYMIA) 7.5-46 MG CP24     predniSONE (DELTASONE) 10 MG tablet     VITAMIN E NATURAL PO     No current facility-administered medications for this visit.       LABS:  Sodium   Date Value Ref Range Status   11/11/2013 138 133 - 144 mmol/L Final     Potassium   Date Value Ref Range Status   11/11/2013 3.7 3.4 - 5.3 mmol/L Final     Chloride   Date Value Ref Range Status   11/11/2013 103 94 - 109 mmol/L Final     Carbon Dioxide   Date Value Ref Range Status   11/11/2013 30 20 - 32 mmol/L Final     Anion Gap   Date Value Ref Range Status   11/11/2013 5 (L) 6 - 17 mmol/L Final     Glucose   Date Value Ref Range Status   11/13/2013 123 (H) 60 - 99 mg/dL Final     Urea Nitrogen   Date Value Ref Range Status   11/11/2013 13 5 - 24 mg/dL Final     Creatinine   Date Value Ref Range Status   11/11/2013 0.64 0.52 - 1.04 mg/dL Final     GFR Estimate   Date Value Ref Range Status   11/11/2013 >90 >60 mL/min/1.7m2 Final     Calcium   Date Value Ref Range Status   11/11/2013 8.8 8.5 - 10.4 mg/dL Final        BP Readings from Last 6 Encounters:   06/08/21 124/80   02/08/21 130/78   02/01/21 116/64   10/19/20 118/74   09/28/20 120/74   09/25/19  "120/80       Pulse Readings from Last 6 Encounters:   07/26/22 86   02/01/21 78   09/28/20 84   09/25/19 84   09/03/19 72   06/13/18 78        PE:  Ht 5' 5\" (1.651 m)   Wt 195 lb (88.5 kg)   BMI 32.45 kg/m    GENERAL: Healthy, alert and no distress  EYES: Eyes grossly normal to inspection.  No discharge or erythema, or obvious scleral/conjunctival abnormalities.  RESP: No audible wheeze, cough, or visible cyanosis.  No visible retractions or increased work of breathing.    SKIN: Visible skin clear. No significant rash, abnormal pigmentation or lesions.  NEURO: Cranial nerves grossly intact.  Mentation and speech appropriate for age.  PSYCH: Mentation appears normal, affect normal/bright, judgement and insight intact, normal speech and appearance well-groomed.    Sincerely,      Carmela Bronson PA-C    "

## 2022-10-17 NOTE — PROGRESS NOTES
Video-Visit Details    Type of service:  Video Visit    Video Start Time (time video started): 1:01    Video End Time (time video stopped): 1:26    Originating Location (pt. Location): Other work    Distant Location (provider location):  On-site    Mode of Communication:  Video Conference via [facility] WEIGHT LOSS FOLLOW UP      DIAGNOSIS:  Class I  Obesity    NUTRITION HISTORY:    Breakfast: peanut butter on whole grain toast, yogurt + fresh berries or leftovers from dinner     Lunch:  Entree salad with protein or  protein bar (19 g protein)     Dinner:  Salad with grilled shrimp or chicken breast or pork or turkey, roasted veggie, whole grain bread or turkey tacos     Snacks:  3X per week- [place]ekin of roasted almond or blue berries or hummus pretzels knots     Beverage Choices:  200 oz water, black coffee, no ETOH (none for 2-5 months du to GERD)     Exercise: walking 2-3X per week-15 minutes     Other: Patient complains of very dry mouth with use of Qsimia (spoke to her provider about this today). Struggling more with GERD (dosage of medication will be changed). She is trying to avoid: onion, garlic, citrus, tomatoes, dairy, caffeine. Patient reports she has researched GERD extensively and know what food to avoid. Personal stress has improved and she will be transitioning in to a new job.     ANTHROPOMETRICS:    Initial Weight: 215 lb pounds    Previous Weight: 197 pounds    Current Weight:  195 pounds    Weight Change:  decrease 2 pounds/overall 20 lb    BMI: 32.45 kg/m2  Wt Readings from Last 5 Encounters:   10/25/22 88.5 kg (195 lb)   09/20/22 89.4 kg (197 lb)   07/26/22 91.4 kg (201 lb 6.4 oz)   06/14/22 96.2 kg (212 lb)   05/31/22 95.7 kg (211 lb)       MEDICATIONS:  Qusimia    EVALUATION/PROGRESS TOWARDS GOALS:  Previous Goals:  Eat lunch daily or have protein drink-met  Criteria for selecting a protein drink/8-12 ounces:  < 250 calories  15-30 grams protein  < 10 grams total fat  < 10 grams  sugar  Try seated/ chair exercises 2-3X per week (video)-  Continue to work on having protein + 2 other food group at each meal-met    Previous Nutrition Diagnosis:  Obese class I related to excess energy intake as evidence by BMI of 32.78 kg/m2     Current Nutrition Diagnosis:   Obese class I related to excess energy intake as evidence by BMI of 32.45 kg/m2  No change      INTERVENTION:    Nutrition Prescription:  Recommend modified nutrient intake by decreasing energy intake    Implementation:    Meals and Snacks: 3/2    Nutrition Education (Content):    Discussed previous goals and determined new goals    Encourage physical activity    Supported patient in attempted weight loss and behavior changes    Congratulated patient on successful weight loss     Patient verbalizes understanding of weight loss by getting in mor exercise    Anticipate good compliance    Goals:  Increase exercise as able based post-op recovery   Consistently Include 2 different food groups at breakfast when working remote    Follow Up/Monitoring:  Other  -  patient to follow up in 8 weeks    Time Spent With Patient:  25 Minutes  Von Dale RD, LD  [facility]

## 2022-10-25 ENCOUNTER — VIRTUAL VISIT (OUTPATIENT)
Dept: SURGERY | Facility: CLINIC | Age: 57
End: 2022-10-25
Payer: COMMERCIAL

## 2022-10-25 VITALS — BODY MASS INDEX: 32.49 KG/M2 | HEIGHT: 65 IN | WEIGHT: 195 LBS

## 2022-10-25 DIAGNOSIS — E66.811 CLASS 1 OBESITY DUE TO EXCESS CALORIES WITHOUT SERIOUS COMORBIDITY WITH BODY MASS INDEX (BMI) OF 32.0 TO 32.9 IN ADULT: ICD-10-CM

## 2022-10-25 DIAGNOSIS — E66.09 CLASS 1 OBESITY DUE TO EXCESS CALORIES WITHOUT SERIOUS COMORBIDITY WITH BODY MASS INDEX (BMI) OF 32.0 TO 32.9 IN ADULT: ICD-10-CM

## 2022-10-25 DIAGNOSIS — R68.2 DRY MOUTH: ICD-10-CM

## 2022-10-25 DIAGNOSIS — K21.9 GASTROESOPHAGEAL REFLUX DISEASE, UNSPECIFIED WHETHER ESOPHAGITIS PRESENT: Primary | ICD-10-CM

## 2022-10-25 PROCEDURE — 99214 OFFICE O/P EST MOD 30 MIN: CPT | Mod: GT | Performed by: PHYSICIAN ASSISTANT

## 2022-10-25 PROCEDURE — 97803 MED NUTRITION INDIV SUBSEQ: CPT | Mod: GT | Performed by: DIETITIAN, REGISTERED

## 2022-10-25 RX ORDER — PHENTERMINE AND TOPIRAMATE 7.5; 46 MG/1; MG/1
1 CAPSULE, EXTENDED RELEASE ORAL EVERY MORNING
Qty: 30 CAPSULE | Refills: 2 | Status: SHIPPED | OUTPATIENT
Start: 2022-10-25 | End: 2022-11-22

## 2022-10-25 NOTE — PATIENT INSTRUCTIONS
Finesse Mckeon-   It was great to visit with you and learn about your progress! Below are the goals we discussed.  Goals:  Increase exercise as able based post-op recovery   Consistently Include 2 different food groups at breakfast when working remote    Please call [phone] to schedule your next visit with a Dietitian in 2 months.  Thanks!  Von Dale RD, LD  [facility]

## 2022-10-25 NOTE — ASSESSMENT & PLAN NOTE
Patient was congratulated on wt loss success thus far. Healthy habits to assist with further weight loss were discussed. Linda will continue the qysmia but decrease her dose to see if this helps with dry mouth.

## 2022-10-25 NOTE — ASSESSMENT & PLAN NOTE
Increase Nexium to 40 mg twice daily.  Will mychart in 2 weeks to give update.  Could add Famotidine.  Ordered EGD to be coordinated with colonscopy in Jan 2022.

## 2022-10-31 ENCOUNTER — TELEPHONE (OUTPATIENT)
Dept: SURGERY | Facility: CLINIC | Age: 57
End: 2022-10-31

## 2022-10-31 NOTE — TELEPHONE ENCOUNTER
Pt called and stated the pharmacy does not have a refill available for her esomeprazol and would like a refill ASAP as she's almost out. Requested call back or [employer] message with status update.    [phone], detailed voicemail ok.

## 2022-11-01 NOTE — TELEPHONE ENCOUNTER
LM for pt to return call because stated could leave detailed message.   Per Carmela's note when saw pt 10/25/22 -   Would like to know if Nexium 40 mg twice daily is working or if needs to add another medication to help with GERD.   Okay to send  msg back or call.  Tori Turner, MS, RD, RN

## 2022-11-22 ENCOUNTER — TELEPHONE (OUTPATIENT)
Dept: SURGERY | Facility: CLINIC | Age: 57
End: 2022-11-22

## 2022-11-22 DIAGNOSIS — E66.811 CLASS 1 OBESITY DUE TO EXCESS CALORIES WITHOUT SERIOUS COMORBIDITY WITH BODY MASS INDEX (BMI) OF 32.0 TO 32.9 IN ADULT: ICD-10-CM

## 2022-11-22 DIAGNOSIS — E66.09 CLASS 1 OBESITY DUE TO EXCESS CALORIES WITHOUT SERIOUS COMORBIDITY WITH BODY MASS INDEX (BMI) OF 32.0 TO 32.9 IN ADULT: ICD-10-CM

## 2022-11-22 RX ORDER — PHENTERMINE AND TOPIRAMATE 7.5; 46 MG/1; MG/1
1 CAPSULE, EXTENDED RELEASE ORAL EVERY MORNING
Qty: 30 CAPSULE | Refills: 2 | Status: SHIPPED | OUTPATIENT
Start: 2022-11-22 | End: 2023-01-13

## 2022-11-22 NOTE — TELEPHONE ENCOUNTER
Pt's Gerd is better, also she also needs Qsymia refilled before she goes out of town. She is trying to find a pharmacy that has prescription    [phone]

## 2022-11-22 NOTE — TELEPHONE ENCOUNTER
Called pt who states [facility] is [facility].  Will let us know where she is able to find it.  States is leaving for airport tomorrow morning and would like it.    Was told by pharmacy that she is refilling a day early and will need us to over ride wherever it is called in to.  Tori Turner, MS, RD, RN

## 2022-11-22 NOTE — TELEPHONE ENCOUNTER
Patient called and notified that prescription was sent and over ride done per pt request.  Tori Turner MS, RD, RN

## 2022-11-22 NOTE — TELEPHONE ENCOUNTER
Patient calling back regarding message below. [facility] in [place]. Phone# [phone]

## 2022-12-05 NOTE — PROGRESS NOTES
Linda is a 57 year old who is being evaluated via a billable pnone visit.      How would you like to obtain your AVS? [employer]     Phone number: [phone] (home)     Duration of phone call: 17 minutes    Attempted to connect via video, but pt ws on a work lap top and she thinks it was old. Unable to hear pt on [employer].      MEDICAL WEIGHT LOSS FOLLOW UP      DIAGNOSIS:  Class I Obesity    NUTRITION HISTORY:    Breakfast: berries, yogurt or whole grain toast + avocado or toast or small amount of butter or peanut butter + fruit, some times leftover protein     Lunch:  Grab and go salad at work with protein (chickpeas or chicken) or home made soup or kale salad     Dinner:  Protein, roasted veggies, salad     Snacks:  Very rare or almond     Beverage Choices: lots (unable to quantify) water, coffee     Exercise: walking 3-4X per week-15-20 min.      Other: Patient is pleased with her weight loss. she feels like. She is not struggling with hunger. 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    ANTHROPOMETRICS:    Initial Weight: 215 pounds    Previous Weight: 195 pounds    Current Weight:  191.4 pounds    Weight Change: decrease 3.6 pounds    BMI: 31.85 kg/m2  Wt Readings from Last 5 Encounters:   10/25/22 88.5 kg (195 lb)   09/20/22 89.4 kg (197 lb)   07/26/22 91.4 kg (201 lb 6.4 oz)   06/14/22 96.2 kg (212 lb)   05/31/22 95.7 kg (211 lb)       MEDICATIONS:  Qsymia    EVALUATION/PROGRESS TOWARDS GOALS:  Previous Goals:  Increase exercise as able based post-op recovery- met  Consistently Include 2 different food groups at breakfast when working remote-not met     Previous Nutrition Diagnosis:  Obese class I related to excess energy intake as evidence by BMI of 32.45 kg/m2     Current Nutrition Diagnosis:   Obese class I related to excess energy intake as evidence by BMI of 31.85 kg/m2  No change      INTERVENTION:    Nutrition Prescription:  Recommend modified nutrient intake by decreasing  energy intake    Implementation:    Meals and Snacks: 3/0-1    Nutrition Education (Content):    Discussed previous goals and determined new goals    Encourage physical activity    Supported patient in attempted weight loss and behavior changes    Congratulated patient on successful weight loss     Patient verbalizes understanding of weight loss by wanting to improve on minutes of exercise.    Anticipate good compliance    Goals:  Increase minutes of exercise to 30 minutes  Continue to establish boundaries at work to allow for good self care    Follow Up/Monitoring:  Other  -  patient to follow up in 8 weeks    Time Spent With Patient:  17 Minutes  Von Dale RD, LD  [facility]

## 2022-12-12 ENCOUNTER — VIRTUAL VISIT (OUTPATIENT)
Dept: SURGERY | Facility: CLINIC | Age: 57
End: 2022-12-12
Payer: COMMERCIAL

## 2022-12-12 VITALS — WEIGHT: 191.4 LBS | BODY MASS INDEX: 31.85 KG/M2

## 2022-12-12 DIAGNOSIS — E66.811 CLASS 1 OBESITY DUE TO EXCESS CALORIES WITHOUT SERIOUS COMORBIDITY WITH BODY MASS INDEX (BMI) OF 32.0 TO 32.9 IN ADULT: ICD-10-CM

## 2022-12-12 DIAGNOSIS — E66.09 CLASS 1 OBESITY DUE TO EXCESS CALORIES WITHOUT SERIOUS COMORBIDITY WITH BODY MASS INDEX (BMI) OF 32.0 TO 32.9 IN ADULT: ICD-10-CM

## 2022-12-12 PROCEDURE — 97803 MED NUTRITION INDIV SUBSEQ: CPT | Mod: TEL | Performed by: DIETITIAN, REGISTERED

## 2022-12-12 NOTE — PATIENT INSTRUCTIONS
Finesse Mckeon-   It was great to visit with you and learn about your progress! Below are the goals we discussed.  Goals:  Increase minutes of exercise to 30 minutes  Continue to establish boundaries at work to allow for good self care  Please call [phone] to schedule your next visit with a Dietitian in 2  months.  Thanks!  Von Dale RD, LD  [facility]

## 2023-01-09 NOTE — PROGRESS NOTES
Linda is a 57 year old who is being evaluated via a billable video visit.      Video-Visit Details    Type of service:  Video Visit    Originating Location (pt. Location): Home    Distant Location (provider location):  [facility] [place]     Platform used for Video Visit: [employer]     2023    Return Medical Weight Management Note     Linda Mina  MRN:  [id number]  :  1965      Dear Dano Stuart,    I had the pleasure of doing a visit with your patient Linda Mina.  She is a 57 year old female who I am continuing to see for treatment of obesity related to:    No flowsheet data found.    Assessment & Plan   Problem List Items Addressed This Visit     Class 1 obesity due to excess calories without serious comorbidity with body mass index (BMI) of 31.0 to 31.9 in adult - Primary     Patient was congratulated on wt loss success thus far. Healthy habits to assist with further weight loss were discussed. Linda will continue the qsysmia. She will re focus her exercise as she recovers from Covid and stock the house with healthy foods when her taste returns. She has a follow up with the dietician in Feb and with me in April.            Relevant Medications    Phentermine-Topiramate (QSYMIA) 7.5-46 MG CP24    Gastroesophageal reflux disease, unspecified whether esophagitis present     Well controlled on pantoprazole.  Will Continue BID dose.  Will schedule endoscopy to rule out esophagitis since she has had chronic GERD for so long.           Relevant Medications    esomeprazole (NEXIUM) 40 MG DR capsule   Other Visit Diagnoses     Class 1 obesity due to excess calories without serious comorbidity with body mass index (BMI) of 32.0 to 32.9 in adult        Relevant Medications    Phentermine-Topiramate (QSYMIA) 7.5-46 MG CP24           PATIENT INSTRUCTIONS:  Continue Qsymia.  Continue Pantoprazole.  Schedule Endoscopy    Goals:  Restock the house with healthy  foods  Reintroduce exercise.  Walking at work and on nice days go on a walk with the dogs.      FOLLOW-UP:    She has a follow up with the dietician in Feb and with me in April.       32 minutes spent on the date of the encounter doing chart review, history and exam, result review, counseling, developing plan of care, documentation, and further activities as noted      INTERVAL HX: Linda returns for medical weight management follow up.  Last seen in Oct.  We decreased her Qsymia to see if this helps with dry mouth.   Pts GERD was so bad  could not  make food for pt anymore.  Increase her Nexium to twice daily.  GERD is better.  Is able to eat more foods without GERD symptoms.  Is symptom free.  Ordered EGD to be coordinated with colonscopy in Jan 2023 has been unable to coordinate but would still like to do.  She will make appt to have this on a different day.    Is recovering from Covid  Still has a lingering cough. Has been doing well since our last visit.  With the exception of the past weeks, she has been able to be active and mindful in her eating.  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      Diet goals 12/12/2022  Increase minutes of exercise to 30 minutes- was successful   Continue to establish boundaries at work to allow for good self care- met does not work the killer hours anymore.  Leaves her office and does not come back.  She is almost out of her old role and will be fully in her new role by April.     WEIGHT METRICS:  Body mass index is 31.02 kg/m .   Current Weight: 186 lb 6.4 oz (84.6 kg) (pt reported)  Last Visits Weight: 195 lb (88.5 kg)  Initial Weight (lbs): 211 lbs  Cumulative weight loss (lbs): 24.6  Weight Loss Percentage: 11.66%    Weight Loss Medication (AOM) History Reviewed       Current weight loss medication/s taking:    qsymia   If not taking an AOM prescribed to you, please indicate why:    Any side  effects from the medication?          Changes and Difficulties      Diet changes since last visit:   Able to eat more foods now that GERD controlled   With regards to diet, pt still struggling with: None     Activity changes since last visit:   Less currently with Covid   With regards to activity, pt still struggling with: sickness       MEDICATIONS:   Current Outpatient Medications   Medication     buPROPion (WELLBUTRIN XL) 300 MG 24 hr tablet     cetirizine (ZYRTEC) 10 MG tablet     Cholecalciferol (VITAMIN D3 PO)     esomeprazole (NEXIUM) 40 MG DR capsule     fluconazole (DIFLUCAN) 150 MG tablet     levothyroxine (SYNTHROID/LEVOTHROID) 112 MCG tablet     LUTEIN PO     mometasone (NASONEX) 50 MCG/ACT nasal spray     multivitamin w/minerals (THERA-VIT-M) tablet     Phentermine-Topiramate (QSYMIA) 7.5-46 MG CP24     predniSONE (DELTASONE) 10 MG tablet     VITAMIN E NATURAL PO     No current facility-administered medications for this visit.       LABS:  Sodium   Date Value Ref Range Status   11/11/2013 138 133 - 144 mmol/L Final     Potassium   Date Value Ref Range Status   11/11/2013 3.7 3.4 - 5.3 mmol/L Final     Chloride   Date Value Ref Range Status   11/11/2013 103 94 - 109 mmol/L Final     Carbon Dioxide   Date Value Ref Range Status   11/11/2013 30 20 - 32 mmol/L Final     Anion Gap   Date Value Ref Range Status   11/11/2013 5 (L) 6 - 17 mmol/L Final     Glucose   Date Value Ref Range Status   11/13/2013 123 (H) 60 - 99 mg/dL Final     Urea Nitrogen   Date Value Ref Range Status   11/11/2013 13 5 - 24 mg/dL Final     Creatinine   Date Value Ref Range Status   11/11/2013 0.64 0.52 - 1.04 mg/dL Final     GFR Estimate   Date Value Ref Range Status   11/11/2013 >90 >60 mL/min/1.7m2 Final     Calcium   Date Value Ref Range Status   11/11/2013 8.8 8.5 - 10.4 mg/dL Final        BP Readings from Last 6 Encounters:   06/08/21 124/80   02/08/21 130/78   02/01/21 116/64   10/19/20 118/74   09/28/20 120/74   09/25/19  "120/80       Pulse Readings from Last 6 Encounters:   07/26/22 86   02/01/21 78   09/28/20 84   09/25/19 84   09/03/19 72   06/13/18 78        PE:  Ht 5' 5\" (1.651 m)   Wt 186 lb 6.4 oz (84.6 kg)   BMI 31.02 kg/m    GENERAL: Healthy, alert and no distress  EYES: Eyes grossly normal to inspection.  No discharge or erythema, or obvious scleral/conjunctival abnormalities.  RESP: No audible wheeze, cough, or visible cyanosis.  No visible retractions or increased work of breathing.    SKIN: Visible skin clear. No significant rash, abnormal pigmentation or lesions.  NEURO: Cranial nerves grossly intact.  Mentation and speech appropriate for age.  PSYCH: Mentation appears normal, affect normal/bright, judgement and insight intact, normal speech and appearance well-groomed.    Sincerely,      Carmela Bronson PA-C    "

## 2023-01-12 VITALS — WEIGHT: 186.4 LBS | HEIGHT: 65 IN | BODY MASS INDEX: 31.06 KG/M2

## 2023-01-13 ENCOUNTER — VIRTUAL VISIT (OUTPATIENT)
Dept: SURGERY | Facility: CLINIC | Age: 58
End: 2023-01-13
Payer: COMMERCIAL

## 2023-01-13 DIAGNOSIS — E66.09 CLASS 1 OBESITY DUE TO EXCESS CALORIES WITHOUT SERIOUS COMORBIDITY WITH BODY MASS INDEX (BMI) OF 32.0 TO 32.9 IN ADULT: ICD-10-CM

## 2023-01-13 DIAGNOSIS — E66.811 CLASS 1 OBESITY DUE TO EXCESS CALORIES WITHOUT SERIOUS COMORBIDITY WITH BODY MASS INDEX (BMI) OF 32.0 TO 32.9 IN ADULT: ICD-10-CM

## 2023-01-13 DIAGNOSIS — K21.9 GASTROESOPHAGEAL REFLUX DISEASE, UNSPECIFIED WHETHER ESOPHAGITIS PRESENT: ICD-10-CM

## 2023-01-13 DIAGNOSIS — E66.811 CLASS 1 OBESITY DUE TO EXCESS CALORIES WITHOUT SERIOUS COMORBIDITY WITH BODY MASS INDEX (BMI) OF 31.0 TO 31.9 IN ADULT: Primary | ICD-10-CM

## 2023-01-13 DIAGNOSIS — E66.09 CLASS 1 OBESITY DUE TO EXCESS CALORIES WITHOUT SERIOUS COMORBIDITY WITH BODY MASS INDEX (BMI) OF 31.0 TO 31.9 IN ADULT: Primary | ICD-10-CM

## 2023-01-13 PROCEDURE — 99214 OFFICE O/P EST MOD 30 MIN: CPT | Mod: GT | Performed by: PHYSICIAN ASSISTANT

## 2023-01-13 RX ORDER — PHENTERMINE AND TOPIRAMATE 7.5; 46 MG/1; MG/1
1 CAPSULE, EXTENDED RELEASE ORAL EVERY MORNING
Qty: 90 CAPSULE | Refills: 1 | Status: SHIPPED | OUTPATIENT
Start: 2023-01-13 | End: 2023-02-24

## 2023-01-13 RX ORDER — ESOMEPRAZOLE MAGNESIUM 40 MG/1
40 CAPSULE, DELAYED RELEASE ORAL 2 TIMES DAILY
Qty: 180 CAPSULE | Refills: 1 | Status: SHIPPED | OUTPATIENT
Start: 2023-01-13

## 2023-01-13 NOTE — PATIENT INSTRUCTIONS
"Nice to talk with you today. Thank you for allowing me the privilege of caring for you. We hope we provided you with the excellent service you deserve.     To ensure the quality of our services you may receive a patient satisfaction survey from an independent monitoring company.  The greatest compliment you can give is \"Likely to Recommend\"    Below is our plan we discussed.-  LIZZ Casey      Plan:  Continue Qsymia.  Continue Pantoprazole.  Schedule Endoscopy    Goals:  Restock the house with healthy foods  Reintroduce exercise.  Walking at work and on nice days go on a walk with the dogs.      FOLLOW-UP:    She has a follow up with the dietician in Feb and with me in April.     "

## 2023-01-13 NOTE — ASSESSMENT & PLAN NOTE
Well controlled on pantoprazole.  Will Continue BID dose.  Will schedule endoscopy to rule out esophagitis since she has had chronic GERD for so long.

## 2023-01-13 NOTE — ASSESSMENT & PLAN NOTE
Patient was congratulated on wt loss success thus far. Healthy habits to assist with further weight loss were discussed. Linda will continue the qsysmia. She will re focus her exercise as she recovers from Covid and stock the house with healthy foods when her taste returns. She has a follow up with the dietician in Feb and with me in April.

## 2023-02-06 NOTE — PROGRESS NOTES
Video-Visit Details    Type of service:  Video Visit    Video Start Time (time video started): 1:30    Video End Time (time video stopped): 1:49    Originating Location (pt. Location): Home    Distant Location (provider location):  On-site    Mode of Communication:  Video Conference via [facility] WEIGHT LOSS FOLLOW UP      DIAGNOSIS:  Class I Obesity    NUTRITION HISTORY:    Breakfast: yogurt + fresh berries or fresh berries or 2 Belvita crackers     Lunch:  lettuce salad + protein or tacos, fresh fruit or grab and go salad at work     Dinner:  Pork or chicken, roasted veggies or pan fried green beans, sometimes red potatoes or turkey tacos with avocado/ salsa     Snacks: 0-2X per day- sesame sticks or blue berries or pop corn popped in smart balance oil-       Beverage Choices:  80 oz water, 1/4-1/2 cup coffee, rare-1 glass of wine     Exercise: walking 3X per week for 15 minutes     Other: Starting a new job at her company in May. Still doing both jobs, but is managing the stress better. She is pleased with her weight loss so far.    ANTHROPOMETRICS:    Initial Weight: 215 pounds    Previous Weight: 191.4 pounds    Current Weight:  187 pounds    Weight Change:  decrease 4.4 pounds/ overall down 28 lb    BMI: 31.12 kg/m2    MEDICATIONS:  Qsymia    EVALUATION/PROGRESS TOWARDS GOALS:  Previous Goals:  Increase minutes of exercise to 30 minutes-not met  Continue to establish boundaries at work to allow for good self care-improving    Previous Nutrition Diagnosis:  Obese class I related to excess energy intake as evidence by BMI of 31.85 kg/m2     Current Nutrition Diagnosis:   Obese class I related to excess energy intake as evidence by BMI of 31.12 kg/m2  No change      INTERVENTION:    Nutrition Prescription:  Recommend modified nutrient intake by decreasing energy intake    Implementation:    Meals and Snacks: 3/0-2    Nutrition Education (Content):    Discussed previous goals and determined new  goals    Encourage physical activity    Supported patient in attempted weight loss and behavior changes     Congratulated patient on successful weight loss     Patient verbalizes understanding of diet by stating exercise is hard for her.    Anticipate good compliance    Goals:  Increase walking to 5 X per week for 15 minutes  Eat protein daily at breakfast     Follow Up/Monitoring:  Other  -  patient to follow up in 3 months (will see provider in April)    Time Spent With Patient:  19 Minutes  Von Dale RD, LD  [facility]

## 2023-02-14 ENCOUNTER — VIRTUAL VISIT (OUTPATIENT)
Dept: SURGERY | Facility: CLINIC | Age: 58
End: 2023-02-14
Payer: COMMERCIAL

## 2023-02-14 VITALS — BODY MASS INDEX: 31.12 KG/M2 | WEIGHT: 187 LBS

## 2023-02-14 DIAGNOSIS — E66.811 CLASS 1 OBESITY DUE TO EXCESS CALORIES WITHOUT SERIOUS COMORBIDITY WITH BODY MASS INDEX (BMI) OF 31.0 TO 31.9 IN ADULT: Primary | ICD-10-CM

## 2023-02-14 DIAGNOSIS — E66.09 CLASS 1 OBESITY DUE TO EXCESS CALORIES WITHOUT SERIOUS COMORBIDITY WITH BODY MASS INDEX (BMI) OF 31.0 TO 31.9 IN ADULT: Primary | ICD-10-CM

## 2023-02-14 PROCEDURE — 97803 MED NUTRITION INDIV SUBSEQ: CPT | Mod: VID

## 2023-02-14 NOTE — PATIENT INSTRUCTIONS
Finesse Mckeon-  Welcome to the [facility] [place]! It was great to visit with you and learn about your progress. Below are the goals we discussed.  Goals:  Increase walking to 5 X per week for 15 minutes  Eat protein daily at breakfast     Please call [phone] to schedule your next visit/visits with a Dietitian in 3 months.  Thanks!  Von Dale RD, LD  [facility]

## 2023-02-23 ENCOUNTER — TELEPHONE (OUTPATIENT)
Dept: SURGERY | Facility: CLINIC | Age: 58
End: 2023-02-23
Payer: COMMERCIAL

## 2023-02-23 DIAGNOSIS — E66.09 CLASS 1 OBESITY DUE TO EXCESS CALORIES WITHOUT SERIOUS COMORBIDITY WITH BODY MASS INDEX (BMI) OF 32.0 TO 32.9 IN ADULT: ICD-10-CM

## 2023-02-23 DIAGNOSIS — E66.811 CLASS 1 OBESITY DUE TO EXCESS CALORIES WITHOUT SERIOUS COMORBIDITY WITH BODY MASS INDEX (BMI) OF 32.0 TO 32.9 IN ADULT: ICD-10-CM

## 2023-02-23 NOTE — TELEPHONE ENCOUNTER
Patient called regarding her Phentermine-Topiramate (QSYMIA) 7.5-46 MG CP24 refill. Patient is asking that 1 mo be sent to [facility] & 3 mo sent to [facility].    [phone] okay to leave VM

## 2023-02-24 RX ORDER — PHENTERMINE AND TOPIRAMATE 7.5; 46 MG/1; MG/1
1 CAPSULE, EXTENDED RELEASE ORAL EVERY MORNING
Qty: 30 CAPSULE | Refills: 0 | Status: SHIPPED | OUTPATIENT
Start: 2023-02-24 | End: 2023-02-24

## 2023-02-24 RX ORDER — PHENTERMINE AND TOPIRAMATE 7.5; 46 MG/1; MG/1
1 CAPSULE, EXTENDED RELEASE ORAL EVERY MORNING
Qty: 90 CAPSULE | Refills: 0 | Status: SHIPPED | OUTPATIENT
Start: 2023-02-24 | End: 2023-04-11

## 2023-03-02 ENCOUNTER — HOSPITAL ENCOUNTER (OUTPATIENT)
Facility: CLINIC | Age: 58
End: 2023-03-02
Attending: STUDENT IN AN ORGANIZED HEALTH CARE EDUCATION/TRAINING PROGRAM | Admitting: STUDENT IN AN ORGANIZED HEALTH CARE EDUCATION/TRAINING PROGRAM
Payer: COMMERCIAL

## 2023-03-02 ENCOUNTER — TELEPHONE (OUTPATIENT)
Dept: GASTROENTEROLOGY | Facility: CLINIC | Age: 58
End: 2023-03-02
Payer: COMMERCIAL

## 2023-03-02 NOTE — TELEPHONE ENCOUNTER
Screening Questions  BLUE  KIND OF PREP RED  LOCATION [review exclusion criteria] GREEN  SEDATION TYPE        Y Are you active on mychart?       WILY BRYAN Ordering/Referring Provider?        HP What type of coverage do you have?      N Have you had a positive covid test in the last 14 days?     30.2 1. BMI  [BMI 40+ - review exclusion criteria]    Y  2. Are you able to give consent for your medical care? [IF NO,RN REVIEW]          N  3. Are you taking any prescription pain medications on a routine schedule   (ex narcotics: oxycodone, roxicodone, oxycontin,  and percocet)? [RN Review]          3a. EXTENDED PREP What kind of prescription?     N 4. Do you have any chemical dependencies such as alcohol, street drugs, or methadone?        **If yes 3- 5 , please schedule with MAC sedation.**          IF YES TO ANY 6 - 10 - HOSPITAL SETTING ONLY.     N 6.   Do you need assistance transferring?     N 7.   Have you had a heart or lung transplant?    N 8.   Are you currently on dialysis?   N 9.   Do you use daily home oxygen?   N 10. Do you take nitroglycerin?   10a.  If yes, how often?     11. [FEMALES]   Are you currently pregnant?    11a.  If yes, how many weeks? [ Greater than 12 weeks, OR NEEDED]    N 12. Do you have Pulmonary Hypertension? *NEED PAC APPT AT [facility] w/ MAC*     N 13. [review exclusion criteria]  Do you have any implantable devices in your body (pacemaker, defib, LVAD)?    N 14. In the past 6 months, have you had any heart related issues including cardiomyopathy or heart attack?     14a.  If yes, did it require cardiac stenting if so when?     N 15. Have you had a stroke or Transient ischemic attack (TIA - aka  mini stroke ) within 6 months?      Y 16. Do you have mod to severe Obstructive Sleep Apnea?  [Hospital only]    N 17. Do you have SEVERE AND UNCONTROLLED asthma? *NEED PAC APPT AT [facility] w/MAC*     18. Are you currently taking any blood thinners?     18a. No. Continue to 19.   18b.  "Yes/no Blood Thinner: No. Inform patient to \"follow up w/ ordering provider for bridging instructions.\"    N 19. Do you take the medication Phentermine?    19a. If yes, \"Hold for 7 days before procedure.  Please consult your prescribing provider if you have questions about holding this medication.\"     N  20. Do you have chronic kidney disease?      N  21. Do you have a diagnosis of diabetes?      23. Preferred LOCAL Pharmacy for Pre Prescription    [ LIST ONLY ONE PHARMACY]       [id number] - [place], [place] - [address]        - CLOSING REMINDERS -    Informed patient they will need an adult    Cannot take any type of public or medical transportation alone    Conscious Sedation- Needs  for 6 hours after the procedure       MAC/General-Needs  for 24 hours after procedure    Pre-Procedure Covid test to be completed [[facility] PCR Testing Required]    Confirmed Nurse will call to complete assessment       - SCHEDULING DETAILS -  Y Hospital Setting Required? If yes, what is the exclusion?: BAILEY PINEDA  Surgeon    4/11/2023  Date of Procedure  Upper Endoscopy [EGD]  Type of Procedure Scheduled  [facility]   MODERATE Sedation Type     NO PAC / Pre-op Required                 "

## 2023-03-17 ENCOUNTER — MYC MEDICAL ADVICE (OUTPATIENT)
Dept: SURGERY | Facility: CLINIC | Age: 58
End: 2023-03-17
Payer: COMMERCIAL

## 2023-03-17 NOTE — TELEPHONE ENCOUNTER
Per pt [facility] does not fill controlled substances.  Called Qsymia to [facility] in [place] and spoke to [place] pharmacist.  Pt notified of call.  Tori Turner, MS, RD, RN

## 2023-03-27 ENCOUNTER — HOSPITAL ENCOUNTER (OUTPATIENT)
Facility: CLINIC | Age: 58
End: 2023-03-27
Attending: INTERNAL MEDICINE | Admitting: INTERNAL MEDICINE
Payer: COMMERCIAL

## 2023-03-27 ENCOUNTER — TELEPHONE (OUTPATIENT)
Dept: GASTROENTEROLOGY | Facility: CLINIC | Age: 58
End: 2023-03-27
Payer: COMMERCIAL

## 2023-03-27 ENCOUNTER — TELEPHONE (OUTPATIENT)
Dept: GASTROENTEROLOGY | Facility: CLINIC | Age: 58
End: 2023-03-27

## 2023-03-27 NOTE — TELEPHONE ENCOUNTER
"Contacted patient regarding upcoming Upper endoscopy (EGD) procedure on 4/11/23 for pre assessment questions. Unable to complete after discussing sedation.     Per chart review, pt sent in [employer] message 3/2/23: \"We need to discuss the IV sedation; as it typically does not work on me.  For my regular colonoscopies my physician will no longer use IV sedation. They are putting me fully under instead they are putting me under general anesthesia.\"    Facility location: [facility]; [address] [place] [address] [place] [address]     Sedation type: Conscious sedation      Pt is scheduled with conscious/moderate sedation and has significant concerns for sedation type.    \"I do not sedate well. It takes a ton of drugs. I have woken up during a surgery before. I have anxiety about surgery, that the sedation will not work. This has happened time and time again. My other doctor recommended I be completely put under for colonoscopy procedures\"    9/28/20 Colonoscopy report: \"Patient should have MAC for all future colonoscopies\"    Per pt, colonoscopy due this year (discussed having EGD/Colon same day under MAC) but wants to schedule with previous provider (Dr. Correa). Pt stated Dr. Correa does not complete EGD procedures. With this, pt would like to keep EGD as a separate procedure from colonoscopy.    Due to pt reported significant anxiety and history of poor sedation, would suggest MAC sedation for EGD procedure.    Connected pt with endo scheduling team to assist with rescheduling patient with MAC sedation in hospital setting d/t BAILEY.     Phentermine on med list - hold 7 days prior to procedure.     Procedure indication: Severe GERD despite high PPI treatment.      Trinh Maldonado, ALESSANDRA  Endoscopy Procedure Pre Assessment RN          "

## 2023-03-27 NOTE — TELEPHONE ENCOUNTER
Caller: Linad Mina    Reason for Reschedule/Cancellation (please be detailed, any staff messages or encounters to note?): PT NEEDS MAC PER ANTHONY GOMEZ       Prior to reschedule please review:    Ordering Provider:Carmela Bronson PA-C     Sedation per order:CS    Does patient have any ASC Exclusions, please identify?: Y BAILEY       Notes on Cancelled Procedure:    Procedure:Upper Endoscopy [EGD]     Date: 04/11/2023    Location:[facility]; [address] [place] [address]    Surgeon: MIRIAM        Rescheduled: Yes    Procedure: Upper Endoscopy [EGD]     Date: 05/15/2023    Location:[facility]; [address] [place] [address]    Surgeon: KORINA SALINAS     Sedation Level Scheduled  MAC,  Reason for Sedation Level RN RECOMMEDATION    Prep/Instructions updated and sent: JOVITA

## 2023-03-31 NOTE — PROGRESS NOTES
"  Linda is a 57 year old who is being evaluated via a billable video visit.      The patient has been notified of following:     \"This video visit will be conducted via a call between you and your physician/provider. We have found that certain health care needs can be provided without the need for an in-person physical exam.  This service lets us provide the care you need with a video conversation.  If a prescription is necessary we can send it directly to your pharmacy.  If lab work is needed we can place an order for that and you can then stop by our lab to have the test done at a later time.    Video visits are billed at different rates depending on your insurance coverage.  Please reach out to your insurance provider with any questions.    If during the course of the call the physician/provider feels a video visit is not appropriate, you will not be charged for this service.\"    Patient has given verbal consent for Video visit? Yes    How would you like to obtain your AVS? MyChart    If the video visit is dropped, the invitation should be resent by: Text to cell phone: [phone]    Will anyone else be joining your video visit? No    I    Video-Visit Details    Type of service:  Video Visit    Video Start Time: 9:02 AM    Video End Time: 9:30 AM    Originating Location (pt. Location): Home    Distant Location (provider location):  [facility] [place]     Platform used for Video Visit: HCDC      2023      Return Medical Weight Management Note     Linda Mina  MRN:  [id number]  :  1965    Dear Dano Stuart MD,    I had the pleasure of seeing your patient Linda Mina. She is a 57 year old female who I am continuing to see for treatment of obesity.      Assessment & Plan   Problem List Items Addressed This Visit     Class 1 obesity due to excess calories without serious comorbidity with body mass index (BMI) of 30.0 to 30.9 in adult - Primary     Patient " was congratulated on wt loss success thus far. Healthy habits to assist with further weight loss were discussed. Linda will continue the Qsymia.  She will melatonin prn for sleep.  If sleep still interrupted after she gets into her new role at work, we can decrease her dose.  See goals in pt instructions.            Relevant Medications    Phentermine-Topiramate (QSYMIA) 7.5-46 MG CP24    Gastroesophageal reflux disease, unspecified whether esophagitis present     Continue pantoprazole BID.  Has EGD scheduled for May.  Can use Pepcid prn when having tomato based products.          Telogen effluvium     Discussed patient information on telogen effluvium and discussed normal course of hair loss.          Other Visit Diagnoses     Class 1 obesity due to excess calories without serious comorbidity with body mass index (BMI) of 32.0 to 32.9 in adult        Relevant Medications    Phentermine-Topiramate (QSYMIA) 7.5-46 MG CP24           PATIENT INSTRUCTIONS:  Continue Qsymia.    Can try 1 mg melatonin for sleep.  If sleep still interrupted after you get into new role at work, we can decrease dose.      Can try pepcid 20 mg twice daily as needed when eating tomato based products to se if this helps with diarrhea.      Goals:  Return to walking you dog  Continue eating 3 protein rich meals daily.     FOLLOW-UP:    Please call [phone] to schedule your next visit in 3 mo.    32 minutes spent on the date of the encounter doing chart review, history and exam, result review, counseling, developing plan of care, documentation, and further activities as noted      INTERVAL HISTORY:  Linda returns for medical weight management follow up.  Last seen in 1/13/2023.  On Qsymia.  Is losing about a lb a week.  Happy with medication.  SE:  Is constantly thirsty, some interrupted sleep.  Not sure if this is job stress or phentermine related.  Can fall asleep ok.  Just wakes up thinking about work.     Hunger is not there.  Weight loss  is notisable.  It is consistant.    Hair is falling out.  PCP shows labs are normal.    GERD: Well controlled on pantoprazole.    Endoscopy to rule out esophagitis since she has had chronic GERD schedule for May.  Food group has been able to expand.  Still has to be careful for tomato based products.     Goals:  Restock the house with healthy foods- met  Reintroduce exercise.  Walking at work and on nice days go on a walk with the dogs. - walking at work and biking at home      WEIGHT METRICS:  Body mass index is 30.37 kg/m .   Current Weight: 182 lb 8 oz (82.8 kg)  Last Visits Weight: 187 lb (84.8 kg)  Initial Weight (lbs): 211 lbs  Cumulative weight loss (lbs): 28.5  Weight Loss Percentage: 13.51%    Wt Readings from Last 10 Encounters:   04/11/23 182 lb 8 oz (82.8 kg)   02/14/23 187 lb (84.8 kg)   01/12/23 186 lb 6.4 oz (84.6 kg)   12/12/22 191 lb 6.4 oz (86.8 kg)   10/25/22 195 lb (88.5 kg)   09/20/22 197 lb (89.4 kg)   07/26/22 201 lb 6.4 oz (91.4 kg)   06/14/22 212 lb (96.2 kg)   05/31/22 211 lb (95.7 kg)   06/08/21 199 lb (90.3 kg)            4/6/2023     8:39 PM   Weight Loss Medication History Reviewed With Patient   Which weight loss medications are you currently taking on a regular basis? Qysmia (phentermine/topiramate)      Protein Yogurt  Balanced lunch   Balanced Dinner        4/6/2023     8:39 PM   Changes and Difficulties   I have made the following changes to my diet since my last visit: Added protein at breakfast   With regards to my diet, I am still struggling with: Nothing currently   I have made the following changes to my activity/exercise since my last visit: Increased my exercise to 4 to 5 x a week   With regards to my activity/exercise, I am still struggling with: Strive for 5 days a week   Walking or biking.        MEDICATIONS:   Current Outpatient Medications   Medication Sig Dispense Refill     buPROPion (WELLBUTRIN XL) 300 MG 24 hr tablet TK 1 T PO QD       cetirizine (ZYRTEC) 10 MG  tablet Take 1 tablet by mouth daily       Cholecalciferol (VITAMIN D3 PO) Take 2,000 Units by mouth daily        esomeprazole (NEXIUM) 40 MG DR capsule Take 1 capsule (40 mg) by mouth 2 times daily Take 30-60 minutes before eating. 180 capsule 1     fluconazole (DIFLUCAN) 150 MG tablet Take 1 tablet (150 mg) by mouth every 3 days 2 tablet 3     levothyroxine (SYNTHROID/LEVOTHROID) 112 MCG tablet TK 1 T PO QD       LUTEIN PO Take 1 tablet by mouth daily       mometasone (NASONEX) 50 MCG/ACT nasal spray Spray 2 sprays into both nostrils daily       multivitamin w/minerals (THERA-VIT-M) tablet Take 1 tablet by mouth daily       Phentermine-Topiramate (QSYMIA) 7.5-46 MG CP24 Take 1 capsule by mouth every morning 90 capsule 0     predniSONE (DELTASONE) 10 MG tablet Take 10 mg by mouth       VITAMIN E NATURAL PO Take by mouth daily          LABS:  Sodium   Date Value Ref Range Status   11/11/2013 138 133 - 144 mmol/L Final     Potassium   Date Value Ref Range Status   11/11/2013 3.7 3.4 - 5.3 mmol/L Final     Chloride   Date Value Ref Range Status   11/11/2013 103 94 - 109 mmol/L Final     Carbon Dioxide   Date Value Ref Range Status   11/11/2013 30 20 - 32 mmol/L Final     Anion Gap   Date Value Ref Range Status   11/11/2013 5 (L) 6 - 17 mmol/L Final     Glucose   Date Value Ref Range Status   11/13/2013 123 (H) 60 - 99 mg/dL Final     Urea Nitrogen   Date Value Ref Range Status   11/11/2013 13 5 - 24 mg/dL Final     Creatinine   Date Value Ref Range Status   11/11/2013 0.64 0.52 - 1.04 mg/dL Final     GFR Estimate   Date Value Ref Range Status   11/11/2013 >90 >60 mL/min/1.7m2 Final     Calcium   Date Value Ref Range Status   11/11/2013 8.8 8.5 - 10.4 mg/dL Final     WBC   Date Value Ref Range Status   11/12/2013 12.3 (H) 4.0 - 11.0 10e9/L Final     Hemoglobin   Date Value Ref Range Status   11/13/2013 11.1 (L) 11.7 - 15.7 g/dL Final     Hematocrit   Date Value Ref Range Status   11/12/2013 33.2 (L) 35.0 - 47.0 % Final  "    MCV   Date Value Ref Range Status   11/12/2013 93 78 - 100 fl Final     Platelet Count   Date Value Ref Range Status   11/14/2013 173 150 - 450 10e9/L Final         BP Readings from Last 6 Encounters:   06/08/21 124/80   02/08/21 130/78   02/01/21 116/64   10/19/20 118/74   09/28/20 120/74   09/25/19 120/80       Pulse Readings from Last 6 Encounters:   07/26/22 86   02/01/21 78   09/28/20 84   09/25/19 84   09/03/19 72   06/13/18 78     Blood Pressure 118/82 04/07/2023 3:07 PM CDT     Pulse 84 04/07/2023 3:07 PM CDT        PE:  Ht 5' 5\" (1.651 m)   Wt 182 lb 8 oz (82.8 kg)   BMI 30.37 kg/m    GENERAL: Healthy, alert and no distress  EYES: Eyes grossly normal to inspection.  No discharge or erythema, or obvious scleral/conjunctival abnormalities.  RESP: No audible wheeze, cough, or visible cyanosis.  No visible retractions or increased work of breathing.    SKIN: Visible skin clear. No significant rash, abnormal pigmentation or lesions.  NEURO: Cranial nerves grossly intact.  Mentation and speech appropriate for age.  PSYCH: Mentation appears normal, affect normal/bright, judgement and insight intact, normal speech and appearance well-groomed.      Sincerely,      Carmela Bronson PA-C        "

## 2023-04-11 ENCOUNTER — VIRTUAL VISIT (OUTPATIENT)
Dept: SURGERY | Facility: CLINIC | Age: 58
End: 2023-04-11
Payer: COMMERCIAL

## 2023-04-11 VITALS — HEIGHT: 65 IN | BODY MASS INDEX: 30.41 KG/M2 | WEIGHT: 182.5 LBS

## 2023-04-11 DIAGNOSIS — E66.811 CLASS 1 OBESITY DUE TO EXCESS CALORIES WITHOUT SERIOUS COMORBIDITY WITH BODY MASS INDEX (BMI) OF 32.0 TO 32.9 IN ADULT: ICD-10-CM

## 2023-04-11 DIAGNOSIS — E66.09 CLASS 1 OBESITY DUE TO EXCESS CALORIES WITHOUT SERIOUS COMORBIDITY WITH BODY MASS INDEX (BMI) OF 30.0 TO 30.9 IN ADULT: Primary | ICD-10-CM

## 2023-04-11 DIAGNOSIS — K21.9 GASTROESOPHAGEAL REFLUX DISEASE, UNSPECIFIED WHETHER ESOPHAGITIS PRESENT: ICD-10-CM

## 2023-04-11 DIAGNOSIS — L65.0 TELOGEN EFFLUVIUM: ICD-10-CM

## 2023-04-11 DIAGNOSIS — E66.811 CLASS 1 OBESITY DUE TO EXCESS CALORIES WITHOUT SERIOUS COMORBIDITY WITH BODY MASS INDEX (BMI) OF 30.0 TO 30.9 IN ADULT: Primary | ICD-10-CM

## 2023-04-11 DIAGNOSIS — E66.09 CLASS 1 OBESITY DUE TO EXCESS CALORIES WITHOUT SERIOUS COMORBIDITY WITH BODY MASS INDEX (BMI) OF 32.0 TO 32.9 IN ADULT: ICD-10-CM

## 2023-04-11 PROCEDURE — 99214 OFFICE O/P EST MOD 30 MIN: CPT | Mod: VID | Performed by: PHYSICIAN ASSISTANT

## 2023-04-11 RX ORDER — PHENTERMINE AND TOPIRAMATE 7.5; 46 MG/1; MG/1
1 CAPSULE, EXTENDED RELEASE ORAL EVERY MORNING
Qty: 90 CAPSULE | Refills: 0 | Status: SHIPPED | OUTPATIENT
Start: 2023-04-11 | End: 2023-06-07

## 2023-04-11 NOTE — PATIENT INSTRUCTIONS
"To ensure quality you may receive a patient satisfaction survey. The greatest compliment you can give is \"Likely to Recommend.\"    Nice to talk with you today.  Thank you for your trust. Below is the plan discussed.-  LIZZ Casey      Plan:  Continue Qsymia.    Can try 1 mg melatonin for sleep.  If sleep still interrupted after you get into new role at work, we can decrease dose.      Can try pepcid 20 mg twice daily as needed when eating tomato based products to se if this helps with diarrhea.      Goals:  Return to walking you dog  Continue eating 3 protein rich meals daily.     FOLLOW-UP:    Please call [phone] to schedule your next visit in 3 mo.      "

## 2023-04-11 NOTE — ASSESSMENT & PLAN NOTE
Patient was congratulated on wt loss success thus far. Healthy habits to assist with further weight loss were discussed. Linda will continue the Qsymia.  She will melatonin prn for sleep.  If sleep still interrupted after she gets into her new role at work, we can decrease her dose.  See goals in pt instructions.

## 2023-04-11 NOTE — ASSESSMENT & PLAN NOTE
Continue pantoprazole BID.  Has EGD scheduled for May.  Can use Pepcid prn when having tomato based products.

## 2023-04-15 ENCOUNTER — HEALTH MAINTENANCE LETTER (OUTPATIENT)
Age: 58
End: 2023-04-15

## 2023-06-01 ENCOUNTER — TELEPHONE (OUTPATIENT)
Dept: SURGERY | Facility: CLINIC | Age: 58
End: 2023-06-01

## 2023-06-01 NOTE — TELEPHONE ENCOUNTER
Prior Authorization Retail Medication Request    Medication/Dose: Qsymia 7.5 MG-46 MG Capsules      Insurance Name: [employer]  Insurance ID:  [id number]       Pharmacy Information (if different than what is on RX)    [id number] - [place] - [address] AT [address] [facility] [place] [address]

## 2023-06-02 ENCOUNTER — TELEPHONE (OUTPATIENT)
Dept: SURGERY | Facility: CLINIC | Age: 58
End: 2023-06-02
Payer: COMMERCIAL

## 2023-06-02 DIAGNOSIS — E66.09 CLASS 1 OBESITY DUE TO EXCESS CALORIES WITHOUT SERIOUS COMORBIDITY WITH BODY MASS INDEX (BMI) OF 30.0 TO 30.9 IN ADULT: Primary | ICD-10-CM

## 2023-06-02 DIAGNOSIS — E66.811 CLASS 1 OBESITY DUE TO EXCESS CALORIES WITHOUT SERIOUS COMORBIDITY WITH BODY MASS INDEX (BMI) OF 30.0 TO 30.9 IN ADULT: Primary | ICD-10-CM

## 2023-06-02 RX ORDER — PHENTERMINE HYDROCHLORIDE 15 MG/1
CAPSULE ORAL
Qty: 30 CAPSULE | Refills: 1 | Status: SHIPPED | OUTPATIENT
Start: 2023-06-02 | End: 2023-07-18

## 2023-06-02 RX ORDER — TOPIRAMATE 50 MG/1
50 TABLET, FILM COATED ORAL DAILY
Qty: 30 TABLET | Refills: 1 | Status: SHIPPED | OUTPATIENT
Start: 2023-06-02 | End: 2023-07-18

## 2023-06-02 NOTE — TELEPHONE ENCOUNTER
Incoming call from pt who stated her pharmacy did not receive a prior authorization for the patient's refill of Qsymia.    Of note, PA has never been required until this point.    PA was drafted and routed to PA team yesterday (6/2) but will likely take a week or more to hear back.    Per pt, she is down to 1 more day of medication and needs a refill ASAP or else she will completely run out of medication.    Will route to provider to see if possible to send in separate scripts for topiramate and phentermine and have pt use GoodRx coupon.    Christina Baeza RN        
no

## 2023-06-05 ENCOUNTER — TELEPHONE (OUTPATIENT)
Dept: SURGERY | Facility: CLINIC | Age: 58
End: 2023-06-05
Payer: COMMERCIAL

## 2023-06-05 NOTE — TELEPHONE ENCOUNTER
Prior Authorization Retail Medication Request    Medication/Dose: Phentermine 15 MG Capsule      Insurance Name: [employer]  Insurance ID:  [id number]       Pharmacy Information (if different than what is on RX)    [facility] #[id number] - [place] - [address] AT [facility] [address] [place] [address]

## 2023-06-07 NOTE — TELEPHONE ENCOUNTER
Central Prior Authorization Team   Phone: [phone]      PA Initiation    Medication: PHENTERMINE HCL 15 MG PO CAPS  Insurance Company: [employer] - Phone [phone] Fax [phone]  Pharmacy Filling the Rx: [id number] - [place], [place] - [address]  Filling Pharmacy Phone: [phone]  Filling Pharmacy Fax:    Start Date: 6/7/2023

## 2023-06-07 NOTE — TELEPHONE ENCOUNTER
Qsymia rx moved to [facility] pharmacy in [facility] [place] location due to issues with supply at patient's current pharmacy.    Will discontinue patient's prior Qsymia rx that was sent to [facility].    Christina Baeza RN on 6/7/2023 at 1:14 PM

## 2023-06-08 NOTE — TELEPHONE ENCOUNTER
Prior Authorization Not Needed per Insurance    Medication: QSYMIA 7.5-46 MG PO CP24  Insurance Company: [employer] - Phone [phone] Fax [phone]  Expected CoPay:      Pharmacy Filling the Rx: [id number] - [place] - [address]  Pharmacy Notified: No  Patient Notified: No

## 2023-06-11 NOTE — TELEPHONE ENCOUNTER
Prior Authorization Approval    Medication: PHENTERMINE HCL 15 MG PO CAPS  Authorization Effective Date: 6/8/2023  Authorization Expiration Date: 9/7/2023  Approved Dose/Quantity:   Reference #:     Insurance Company: [employer] [phone] Fax [phone]  Expected CoPay:       CoPay Card Available:      Financial Assistance Needed:   Which Pharmacy is filling the prescription: [id number] - [place], [place] - [address]  Pharmacy Notified: Yes  Patient Notified: No

## 2023-07-03 NOTE — PROGRESS NOTES
Virtual Visit Details    Type of service:  Video Visit     Originating Location (pt. Location): Home  Distant Location (provider location):  Off-site  Platform used for Video Visit: [facility] WEIGHT LOSS FOLLOW UP      DIAGNOSIS:  Class I Overweight/Obesity    NUTRITION HISTORY:    Breakfast: Plain Greek yogurt, Kind granola, sometimes fresh berries     Lunch:  Salad bar or soup cafe at work or leftover from dinner- protein, veggies @ 11:30-1:30     Dinner:  chicken or pork or shimp or tuna or scallops, fresh veggies from garden or Certain chard with ground meat, other veggies     Snacks:  none     Beverage Choices:  60 + oz water, 1 cup coffee per day, rare ice tea,  ETOH- very rare      Exercise: walking or stationary bike 5 X per week-15-30 min     Additional Information: Patient is pleased with weight loss so far but is frustrated with weight plateau. May want to discuss other weight loss medications options with her provider next week. Does not want to commit to a specific strength training goal right now, but will think about it. Patient was appreciative of the support she is getting from this clinic.    ANTHROPOMETRICS:    Initial Weight: 215 pounds    Previous Weight: 187 pounds    Current Weight:  ~182 pounds patient reported    Weight Change: 5 pounds decrease     BMI: 30.29 kg/m2  Wt Readings from Last 5 Encounters:   04/11/23 182 lb 8 oz (82.8 kg)   02/14/23 187 lb (84.8 kg)   01/12/23 186 lb 6.4 oz (84.6 kg)   12/12/22 191 lb 6.4 oz (86.8 kg)   10/25/22 195 lb (88.5 kg)       MEDICATION FOR WEIGHT LOSS:  Qsyimia    EVALUATION/PROGRESS TOWARDS GOALS:  Previous Goals:  Increase walking to 5 X per week for 15 minutes-met  Eat protein daily at breakfast -met      Previous Nutrition Diagnosis:  Overweight/Obese class I related to overeating and poor lifestyle habits as evidence by patient's subjective statements and  BMI of 31.12 kg/m2     Current Nutrition Diagnosis:   Overweight/Obese class I  related to overeating and poor lifestyle habits as evidence by patient's subjective statements and  BMI of 30.29 kg/m2  No change      INTERVENTION:    Nutrition Prescription:  Recommend modified nutrient intake by decreasing energy intake    Implementation:    Meals and Snacks: 3/0    Nutrition Education (Content):    Discussed previous goals and determined new goals    Encourage physical activity    Supported patient in attempted weight loss and behavior changes    Congratulated patient on successful weight loss     Patient verbalizes understanding of weight management by     Anticipate good compliance    Goals:  Consider strength training one time per week (has workouts from )  Aim for a total of  150 minutes of cardio per week    Follow Up/Monitoring:  Other  -  patient to follow up in 6 months (good knowledge of food choices)    Time Spent With Patient:  20 Minutes  Von Dale RD, ALANA  [facility]

## 2023-07-10 ENCOUNTER — VIRTUAL VISIT (OUTPATIENT)
Dept: SURGERY | Facility: CLINIC | Age: 58
End: 2023-07-10
Payer: COMMERCIAL

## 2023-07-10 VITALS — BODY MASS INDEX: 30.29 KG/M2 | WEIGHT: 182 LBS

## 2023-07-10 DIAGNOSIS — E66.811 CLASS 1 OBESITY DUE TO EXCESS CALORIES WITHOUT SERIOUS COMORBIDITY WITH BODY MASS INDEX (BMI) OF 30.0 TO 30.9 IN ADULT: Primary | ICD-10-CM

## 2023-07-10 DIAGNOSIS — E66.09 CLASS 1 OBESITY DUE TO EXCESS CALORIES WITHOUT SERIOUS COMORBIDITY WITH BODY MASS INDEX (BMI) OF 30.0 TO 30.9 IN ADULT: Primary | ICD-10-CM

## 2023-07-10 PROCEDURE — 97803 MED NUTRITION INDIV SUBSEQ: CPT | Mod: VID

## 2023-07-10 NOTE — PATIENT INSTRUCTIONS
Finesse Mckeon-  Congratulations on the weight you have lost so far!  It was great to visit with you and learn about your progress. Below are the goals we discussed.    Goals:  Consider strength training one time per week (has workouts from )  Aim for a total of  150 minutes of cardio per week      Please call [phone] to schedule your next visit with a Dietitian in 6 months.  Thanks!  Von Dale RD, ALANA  [facility]

## 2023-07-15 NOTE — PROGRESS NOTES
"Linda is a 58 year old who is being evaluated via a billable video visit.      The patient has been notified of following:     \"This video visit will be conducted via a call between you and your physician/provider. We have found that certain health care needs can be provided without the need for an in-person physical exam.  This service lets us provide the care you need with a video conversation.  If a prescription is necessary we can send it directly to your pharmacy.  If lab work is needed we can place an order for that and you can then stop by our lab to have the test done at a later time.    Video visits are billed at different rates depending on your insurance coverage.  Please reach out to your insurance provider with any questions.    If during the course of the call the physician/provider feels a video visit is not appropriate, you will not be charged for this service.\"    Patient has given verbal consent for Video visit? Yes    How would you like to obtain your AVS? MyChart    If the video visit is dropped, the invitation should be resent by: MY CHART    Will anyone else be joining your video visit? No    I    Video-Visit Details    Type of service:  Video Visit    Video Start Time: 8:28 AM    Video End Time:9:00 AM    Originating Location (pt. Location): Other Work    Distant Location (provider location):  [facility] [place]     Platform used for Video Visit: [employer]        2023      Return Medical Weight Management Note     Linda Mina  MRN:  [id number]  :  1965    Dear Dano Stuart MD,    I had the pleasure of seeing your patient Linda Mina. She is a 58 year old female who I am continuing to see for treatment of obesity related to:    Assessment & Plan   Problem List Items Addressed This Visit     Class 1 obesity due to excess calories without serious comorbidity with body mass index (BMI) of 30.0 to 30.9 in adult - Primary     Patient was " congratulated on wt loss success thus far. Healthy habits to assist with further weight loss were discussed. Linda will continue Qsymia until current supply gone. Will start Naltrexone.  Risks/ benefits and possible side effects were discussed and questions were answered. Written information was given. Will check back in Sept to see if GLP-1 are back in stock. Goals noted in pt instructions.                Relevant Medications    naltrexone (DEPADE/REVIA) 50 MG tablet        PATIENT INSTRUCTIONS:  Continue Qsymia for the next month (finish current stock)  Start Naltrexone.   Directions: Take 1/2 tab 1-2 hours prior to biggest cravings or extra hunger for a week.  If tolerating, increase to 1 tablet daily.   Check back in Sept to see if Saxenda/Wegovy in stock.     Goals:  Add strength training to you weekly activity    FOLLOW-UP:    Please call [phone] to schedule your next visit in 3 months.     26 minutes spent on the date of the encounter doing chart review, history and exam, result review, counseling, developing plan of care, documentation, and further activities as noted      INTERVAL HISTORY:  Linda returns for medical weight management follow up.  Last seen on 4/11/2023.  On Qsymia.  Patient is pleased with weight loss so far but is frustrated with weight plateau. Would like to stop Qsymia and discuss other weight loss medications options.  Would like to try GLP-1.  Still having trouble waking up early from sleep and has trouble getting back to sleep.     Hunger controlled.  Eating 3 meals healthy meals a day.  Had sugar during birthday and is having some difficulty getting off this.  Cravings occur daily in late afternoon/evening.      Saw RD on 7/10/2023.    Goals:  Consider strength training one time per week (has workouts from )- not met yet  Aim for a total of  150 minutes of cardio per week- partially met     ROS:     HEENT  H/O glaucoma:            no  Cardiovascular  CAD:                             no  Palpitations:                No, Echo WNL following use of Phen-fen.    HTN:                            no  Gastrointestinal  GERD:                         no  Constipation:               no  Gastroparesis:            no  Liver Dz:                      no  H/O Pancreatitis:         no  H/O Gallbladder Dz:    no  Psychiatric  Moods Stable:             yes  Anxiety:                       yes  Depression:                 yes  Bipolar:                        no  H/O ETOH/Drug Use  no  H/O eating disorder:    yes  Endocrine  PMH/FMH of MTC or MEN2:  no  Neurologic:  H/O seizures:              no  Headaches:                 Yes, migraine headaches once a year  Memory Impairment:   no    H/O kidney stones:     no  Kidney disease:          no  Current birth control: Menopause    WEIGHT METRICS:  Body mass index is 30.62 kg/m .   Current Weight: 184 lb (83.5 kg) (patient reported)  Last Visits Weight: 182 lb (82.6 kg)  Initial Weight (lbs): 211 lbs  Cumulative weight loss (lbs): 27  Weight Loss Percentage: 12.8%    Wt Readings from Last 10 Encounters:   07/18/23 184 lb (83.5 kg)   07/10/23 182 lb (82.6 kg)   04/11/23 182 lb 8 oz (82.8 kg)   02/14/23 187 lb (84.8 kg)   01/12/23 186 lb 6.4 oz (84.6 kg)   12/12/22 191 lb 6.4 oz (86.8 kg)   10/25/22 195 lb (88.5 kg)   09/20/22 197 lb (89.4 kg)   07/26/22 201 lb 6.4 oz (91.4 kg)   06/14/22 212 lb (96.2 kg)            4/6/2023     8:39 PM   Weight Loss Medication History Reviewed With Patient   Which weight loss medications are you currently taking on a regular basis? Qysmia (phentermine/topiramate)              Changes and Difficulties   I have made the following changes to my diet since my last visit: Staying the same. Except started to have sugar snack in afternoon   With regards to my diet, I am still struggling with: Nothing currently   I have made the following changes to my activity/exercise since my last visit: Increased my exercise to 4 to 5 x a  week   With regards to my activity/exercise, I am still struggling with: Strive for 5 days a week   Walks or bikes 3-5 times a week.  No strength training.       MEDICATIONS:   Current Outpatient Medications   Medication Sig Dispense Refill     Cholecalciferol (VITAMIN D3 PO) Take 2,000 Units by mouth daily        esomeprazole (NEXIUM) 40 MG DR capsule Take 1 capsule (40 mg) by mouth 2 times daily Take 30-60 minutes before eating. 180 capsule 1     fluconazole (DIFLUCAN) 150 MG tablet Take 1 tablet (150 mg) by mouth every 3 days 2 tablet 3     levothyroxine (SYNTHROID/LEVOTHROID) 112 MCG tablet TK 1 T PO QD       LUTEIN PO Take 1 tablet by mouth daily       mometasone (NASONEX) 50 MCG/ACT nasal spray Spray 2 sprays into both nostrils daily       multivitamin w/minerals (THERA-VIT-M) tablet Take 1 tablet by mouth daily       naltrexone (DEPADE/REVIA) 50 MG tablet Take 1/2 tablet 1-2 hours before biggest craving time for 7 days, then increase to a full tablet 30 tablet 2     predniSONE (DELTASONE) 10 MG tablet Take 10 mg by mouth       VITAMIN E NATURAL PO Take by mouth daily        buPROPion (WELLBUTRIN XL) 300 MG 24 hr tablet TK 1 T PO QD       cetirizine (ZYRTEC) 10 MG tablet Take 1 tablet by mouth daily         LABS:  Sodium   Date Value Ref Range Status   11/11/2013 138 133 - 144 mmol/L Final     Potassium   Date Value Ref Range Status   11/11/2013 3.7 3.4 - 5.3 mmol/L Final     Chloride   Date Value Ref Range Status   11/11/2013 103 94 - 109 mmol/L Final     Carbon Dioxide   Date Value Ref Range Status   11/11/2013 30 20 - 32 mmol/L Final     Anion Gap   Date Value Ref Range Status   11/11/2013 5 (L) 6 - 17 mmol/L Final     Glucose   Date Value Ref Range Status   11/13/2013 123 (H) 60 - 99 mg/dL Final     Urea Nitrogen   Date Value Ref Range Status   11/11/2013 13 5 - 24 mg/dL Final     Creatinine   Date Value Ref Range Status   11/11/2013 0.64 0.52 - 1.04 mg/dL Final     GFR Estimate   Date Value Ref Range  "Status   11/11/2013 >90 >60 mL/min/1.7m2 Final     Calcium   Date Value Ref Range Status   11/11/2013 8.8 8.5 - 10.4 mg/dL Final     WBC   Date Value Ref Range Status   11/12/2013 12.3 (H) 4.0 - 11.0 10e9/L Final     Hemoglobin   Date Value Ref Range Status   11/13/2013 11.1 (L) 11.7 - 15.7 g/dL Final     Hematocrit   Date Value Ref Range Status   11/12/2013 33.2 (L) 35.0 - 47.0 % Final     MCV   Date Value Ref Range Status   11/12/2013 93 78 - 100 fl Final     Platelet Count   Date Value Ref Range Status   11/14/2013 173 150 - 450 10e9/L Final         BP Readings from Last 6 Encounters:   06/08/21 124/80   02/08/21 130/78   02/01/21 116/64   10/19/20 118/74   09/28/20 120/74   09/25/19 120/80       Pulse Readings from Last 6 Encounters:   07/26/22 86   02/01/21 78   09/28/20 84   09/25/19 84   09/03/19 72   06/13/18 78       PE:  Ht 5' 5\" (1.651 m)   Wt 184 lb (83.5 kg)   BMI 30.62 kg/m    GENERAL: Healthy, alert and no distress  EYES: Eyes grossly normal to inspection.  No discharge or erythema, or obvious scleral/conjunctival abnormalities.  RESP: No audible wheeze, cough, or visible cyanosis.  No visible retractions or increased work of breathing.    SKIN: Visible skin clear. No significant rash, abnormal pigmentation or lesions.  NEURO: Cranial nerves grossly intact.  Mentation and speech appropriate for age.  PSYCH: Mentation appears normal, affect normal/bright, judgement and insight intact, normal speech and appearance well-groomed.      Sincerely,      Carmela Bronson PA-C        "

## 2023-07-17 ENCOUNTER — TELEPHONE (OUTPATIENT)
Dept: SURGERY | Facility: CLINIC | Age: 58
End: 2023-07-17
Payer: COMMERCIAL

## 2023-07-18 ENCOUNTER — VIRTUAL VISIT (OUTPATIENT)
Dept: SURGERY | Facility: CLINIC | Age: 58
End: 2023-07-18
Payer: COMMERCIAL

## 2023-07-18 VITALS — WEIGHT: 184 LBS | BODY MASS INDEX: 30.66 KG/M2 | HEIGHT: 65 IN

## 2023-07-18 DIAGNOSIS — E66.811 CLASS 1 OBESITY DUE TO EXCESS CALORIES WITHOUT SERIOUS COMORBIDITY WITH BODY MASS INDEX (BMI) OF 30.0 TO 30.9 IN ADULT: Primary | ICD-10-CM

## 2023-07-18 DIAGNOSIS — E66.09 CLASS 1 OBESITY DUE TO EXCESS CALORIES WITHOUT SERIOUS COMORBIDITY WITH BODY MASS INDEX (BMI) OF 30.0 TO 30.9 IN ADULT: Primary | ICD-10-CM

## 2023-07-18 PROCEDURE — 99213 OFFICE O/P EST LOW 20 MIN: CPT | Mod: GT | Performed by: PHYSICIAN ASSISTANT

## 2023-07-18 RX ORDER — NALTREXONE HYDROCHLORIDE 50 MG/1
TABLET, FILM COATED ORAL
Qty: 30 TABLET | Refills: 2 | Status: SHIPPED | OUTPATIENT
Start: 2023-07-18 | End: 2023-09-26

## 2023-07-18 NOTE — PATIENT INSTRUCTIONS
"To ensure quality you may receive a patient satisfaction survey. The greatest compliment you can give is \"Likely to Recommend.\"    Nice to talk with you today.  Thank you for your trust. Below is the plan discussed.-  LIZZ Casey      Plan:  Continue Qsymia for the next month (finish current stock)  Start Naltrexone.   Directions: Take 1/2 tab 1-2 hours prior to biggest cravings or extra hunger for a week.  If tolerating, increase to 1 tablet daily.   Check back in Sept to see if Saxenda/Wegovy in stock.     Goals:  Add strength training to you weekly activity    FOLLOW-UP:    Please call [phone] to schedule your next visit in 3 months.     Naltrexone    Naltrexone is a medication that is used most often to help people who are troubled by dependence on prescription pain killers or alcohol. It has also been found to help with weight loss. Although it's not currently FDA approved for weight loss, it has been used safely for a number of years to help people who are carrying extra weight.     Just how Naltrexone helps with weight loss has not been exactly determined.  It seems to work by quieting down brain signals related to strong food cravings. Many of our patients use the word \"addiction\" to describe their feelings and constant thoughts about food. It makes sense then to treat the feeling of dependence on food, outside of real hunger, with a medication designed to help with other sorts of dependence.     Our patients on Naltrexone find that they:    >feel less interest in food   >think less about food and eating and have more time to think of other things   >find it easier to push the plate away   >have an easier time eating less    For some of our patients, these feelings are very immediate. Other patients, don't feel much of a change but find they've lost weight. Like all weight loss medications, Naltrexone works best when you help it work. This means:  1. Having less tempting high calorie (fattening) food " around the house or office. (For people with strong cravings this is very important.)   2. Staying away from situations or people that may trigger your cravings .   3. Eating out only one time or less each week.  4. Eating your meals at a table with the TV or computer off.    Side-effects. Naltrexone is generally well tolerated. The main side-effect we see is  nausea or a woozy feeling. A small number of people feel quite ill. Most people have a mild reaction and some people have no reaction at all.  The good news is that this feeling does go away.     In order to avoid nausea, please start the medication with half a pill for the first few days. Go on to a full pill if you are feeling well.      If you  are nauseated on 1/2 a pill it is okay to cut back to 1/4 pill ( a very small amount). Take this for a couple of days and work your way back up to a 1/2 pill and then a whole pill. Taking the medication at night or with food  to start also may help prevent the feeling of nausea.       WARNING: This medication blocks the action of opioid type pain medications.    If you routinely take narcotic pain medication like Codeine, Oxycontin,Percocet,Morphine,Dilaudid or Methodone, do not take this until you have talked with weight management staff.   2.  If you are planning surgery you should stop Naltrexone 4 days prior to the surgery.   3.  If you have an injury that requires pain medication, make sure the health care staff knows you take Naltrexone.     For any questions/concerns contact [facility] [phone]

## 2023-07-18 NOTE — ASSESSMENT & PLAN NOTE
Patient was congratulated on wt loss success thus far. Healthy habits to assist with further weight loss were discussed. Linda will continue Qsymia until current supply gone. Will start Naltrexone.  Risks/ benefits and possible side effects were discussed and questions were answered. Written information was given. Will check back in Sept to see if GLP-1 are back in stock. Goals noted in pt instructions.

## 2023-08-30 NOTE — TELEPHONE ENCOUNTER
A/P: Patient is 32 y.o. yo female with an incomplete  in the first trimester. For dilation and evacuation pending ultrasound results. - Surgical coordinator to preauthorize and schedule. - Bleeding precautions reviewed. S: Patient is a 32 y.o. yo female who was diagnosed with an early pregnancy loss on ultrasound in the [facility] ED on 2023. She had not realized she was pregnant (though she was not preventing pregnancy) until she went to the ED with bleeding that day. The ultrasound showed a 16mm endometrium consistent with retained POCs but no definitive IUP was ever seen on ultrasound. She should have been 6 weeks at that time. Her hcg was 821. She was offered her options of expectant management, medical management with cytotec and surgical management with dilation and evacuation. She opted for cytotec at that time given a high likelihood for success. However, a repeat US on  showed persistence of this finding despite ongoing bleeding. She was advised to have a suction D+E. Since soon after that ultrasound, she reports her bleeding has resolved. She denies passage of tissue but is wondering whether she needs the surgery. We discussed options and will repeat the US to see if we can avoid surgery. If not, we will proceed to the OR for dilation and evacuation as planned. She has a history of PE in 2022 and was told she could stop her anticoagulation at 6 weeks postpartum. She still has to see hematology and have some blood work done. Discussed early ambulation to prevent VTE perioperatively as well as SCDs while under anesthesia.     Past Medical History:   Diagnosis Date   • Anemia    • Gastric bypass status for obesity 2016    sleeve   • Post partum depression    • Sleep apnea, obstructive     prior to weight loss surg      Past Surgical History:   Procedure Laterality Date   • DILATION AND EVACUATION  2019       • GASTRIC RESTRICTION SURGERY   Per phone call from pt, Qsymia was sent to [facility] mail order, not her preferred retail [email or web address] store in [place].     Per speaking with [facility], they are unable to fill Qsymia w/o a PA d/t it being a controlled substance. It was rejected.  Attempted to have representative Jessie cancel exiting prescription but she stated there was not an existing prescription order to cancel as it had been rejected.    A new script was called in to [email or web address] in [place]. Spoke to Liv who will get started on that today.    Pt notified.    Christina Baeza RN     gastric sleeve   • INCISION AND DRAINAGE OF WOUND Right 8/12/2022    Procedure: INCISION AND DRAINAGE (I&D) HEAD/FACE;  Surgeon: Jose Matute DMD;  Location: [facility];  Service: Maxillofacial   • MULTIPLE TOOTH EXTRACTIONS Right 8/12/2022    Procedure: EXTRACTION TEETH MULTIPLE;  Surgeon: Jose Matute DMD;  Location: [facility];  Service: Maxillofacial   • OPEN ANTERIOR SHOULDER RECONSTRUCTION Left 2018   • OTHER SURGICAL HISTORY      sweat gland removal   • RETAINED PLACENTA REMOVAL N/A 6/2/2020    Procedure: EXTRACTION OF MDRRASDA,NFBJLC;  Surgeon: Marylu Tomlin MD;  Location: [facility] ;  Service: Obstetrics     Social History     Socioeconomic History   • Marital status: Single     Spouse name: Not on file   • Number of children: Not on file   • Years of education: Not on file   • Highest education level: Not on file   Occupational History   • Not on file   Tobacco Use   • Smoking status: Former     Packs/day: 0.25     Years: 10.00     Total pack years: 2.50     Types: Cigarettes     Quit date: 4/9/2020     Years since quitting: 3.3     Passive exposure: Past   • Smokeless tobacco: Former   • Tobacco comments:     still smoking 3 day   Vaping Use   • Vaping Use: Never used   Substance and Sexual Activity   • Alcohol use: Not Currently     Comment: social   • Drug use: Never   • Sexual activity: Yes     Partners: Male     Birth control/protection: None   Other Topics Concern   • Not on file   Social History Narrative   • Not on file     Social Determinants of Health     Financial Resource Strain: Not on file   Food Insecurity: No Food Insecurity (8/10/2022)    Hunger Vital Sign    • Worried About Running Out of Food in the Last Year: Never true    • Ran Out of Food in the Last Year: Never true   Transportation Needs: Unknown (8/10/2022)    PRAPARE - Transportation    • Lack of Transportation (Medical): Not on file    • Lack of Transportation (Non-Medical):  No   Physical Activity: Not on file   Stress: Not on file   Social Connections: Not on file   Intimate Partner Violence: Not on file   Housing Stability: Low Risk  (8/10/2022)    Housing Stability Vital Sign    • Unable to Pay for Housing in the Last Year: No    • Number of Places Lived in the Last Year: 1    • Unstable Housing in the Last Year: No        Current Outpatient Medications:   •  ciclopirox (PENLAC) 8 % solution, Apply topically daily at bedtime (Patient not taking: Reported on 2023), Disp: 6.6 mL, Rfl: 0  •  enoxaparin (LOVENOX) 100 mg/mL, Inject 1 mL (100 mg total) under the skin every 12 (twelve) hours, Disp: 84 mL, Rfl: 0  •  terbinafine (LamISIL) 250 mg tablet, Take 250 mg by mouth daily (Patient not taking: Reported on 2023), Disp: , Rfl:     O:  Blood pressure 110/60, height 5' 3.7" (1.618 m), weight 96.3 kg (212 lb 6.4 oz), last menstrual period 2023, not currently breastfeeding. Patient appears well and is not in distress  Abdomen is soft and nontender without masses. External genitals are normal without lesions or rashes. Vagina is normal without discharge or bleeding. Cervix is normal without discharge or lesion. Uterus is dayna in size at 4-6 wks, nontender. Adnexa are normal, nontender, without palpable mass. We reviewed the options of expectant management, medical management with cytotec and surgical management with dilation and evacuation. We reviewed the pros/cons of each of these approaches. We discussed the risks of medical management including uncontrolled bleeding or incomplete  with possible need for surgical evacuation, transfusion, infection. We discussed the risks of surgical management including bleeding, transfusion, infection, uterine perforation with risk of injury to surrounding organs including bowel/bladder, need for laparoscopy, Asherman's syndrome. All questions were answered, and consent was signed. She wishes hospital disposition for the products of conception.

## 2023-08-31 ENCOUNTER — MYC MEDICAL ADVICE (OUTPATIENT)
Dept: SURGERY | Facility: CLINIC | Age: 58
End: 2023-08-31
Payer: COMMERCIAL

## 2023-08-31 DIAGNOSIS — E66.811 CLASS 1 OBESITY DUE TO EXCESS CALORIES WITHOUT SERIOUS COMORBIDITY WITH BODY MASS INDEX (BMI) OF 30.0 TO 30.9 IN ADULT: Primary | ICD-10-CM

## 2023-08-31 DIAGNOSIS — E66.01 CLASS 2 SEVERE OBESITY WITH BODY MASS INDEX (BMI) OF 35 TO 39.9 WITH SERIOUS COMORBIDITY (H): ICD-10-CM

## 2023-08-31 DIAGNOSIS — E66.09 CLASS 1 OBESITY DUE TO EXCESS CALORIES WITHOUT SERIOUS COMORBIDITY WITH BODY MASS INDEX (BMI) OF 30.0 TO 30.9 IN ADULT: Primary | ICD-10-CM

## 2023-08-31 DIAGNOSIS — E66.812 CLASS 2 SEVERE OBESITY WITH BODY MASS INDEX (BMI) OF 35 TO 39.9 WITH SERIOUS COMORBIDITY (H): ICD-10-CM

## 2023-09-05 ENCOUNTER — MYC MEDICAL ADVICE (OUTPATIENT)
Dept: SURGERY | Facility: CLINIC | Age: 58
End: 2023-09-05
Payer: COMMERCIAL

## 2023-09-05 DIAGNOSIS — E66.01 CLASS 2 SEVERE OBESITY WITH BODY MASS INDEX (BMI) OF 35 TO 39.9 WITH SERIOUS COMORBIDITY (H): ICD-10-CM

## 2023-09-05 DIAGNOSIS — E66.812 CLASS 2 SEVERE OBESITY WITH BODY MASS INDEX (BMI) OF 35 TO 39.9 WITH SERIOUS COMORBIDITY (H): ICD-10-CM

## 2023-09-05 RX ORDER — SEMAGLUTIDE 0.25 MG/.5ML
0.25 INJECTION, SOLUTION SUBCUTANEOUS WEEKLY
Qty: 2 ML | Refills: 0 | Status: SHIPPED | OUTPATIENT
Start: 2023-09-05 | End: 2023-09-05

## 2023-09-05 RX ORDER — SEMAGLUTIDE 0.5 MG/.5ML
0.5 INJECTION, SOLUTION SUBCUTANEOUS WEEKLY
Qty: 2 ML | Refills: 0 | Status: SHIPPED | OUTPATIENT
Start: 2023-09-05 | End: 2023-09-07

## 2023-09-05 RX ORDER — SEMAGLUTIDE 1 MG/.5ML
1 INJECTION, SOLUTION SUBCUTANEOUS WEEKLY
Qty: 2 ML | Refills: 0 | Status: SHIPPED | OUTPATIENT
Start: 2023-09-05 | End: 2023-09-07

## 2023-09-05 RX ORDER — SEMAGLUTIDE 0.25 MG/.5ML
0.25 INJECTION, SOLUTION SUBCUTANEOUS WEEKLY
Qty: 2 ML | Refills: 0 | Status: SHIPPED | OUTPATIENT
Start: 2023-09-05 | End: 2023-09-07

## 2023-09-05 NOTE — TELEPHONE ENCOUNTER
Wegovy prescribed by PENNY.   Cassandra sent in another encounter.   Patsy Celaya RN on 9/5/2023 at 3:54 PM

## 2023-09-05 NOTE — TELEPHONE ENCOUNTER
"Sent Wegovy to Pharmacy.     I ordered Wegovy to your pharmacy.  You may need to be persistent and patient to get these initial dosages due to the shortage.  Once you are able to obtain the 0.25 and 0.5 mg dose \"8 weeks of therapy\" you can begin treatment.      Start Wegovy (semaglutide)   0.25 mg once weekly for 4 weeks,   if tolerating increase to 0.5 mg weekly for 4 weeks,   if tolerating increase to 1 mg weekly    May use famotidine 20 mg twice daily as needed for nausea/heartburn when starting the medication.     WEGOVY (semaglutide)    Wegovy (semaglutide) injection 2.4 mg is an injectable prescription medicine used for adults with obesity (BMI =30) or overweight (excess weight) (BMI =27) who also have weight-related medical problems to help them lose weight and keep the weight off.  It is a GLP-1 agonist medication. GLP1 agonists stimulate the hormone GLP-1 tin your body o allow you to feel full quickly and stay full longer.    Directions:  Start Wegovy (semaglutide) 0.25 mg once weekly for 4 weeks, then if tolerating increase to 0.5 mg weekly for 4 weeks, then if tolerating increase to 1 mg weekly for 4 weeks, then if tolerating increase to 1.7 mg weekly for 4 weeks, then if tolerating increase to 2.4 mg weekly thereafter.    -Each Wegovy pen is a once weekly single-dose prefilled pen with a pen injector already built within the pen. Discard the Wegovy pen after use in sharps container.     Common Side Effects:    nausea, headache, diarrhea, stomach upset.  If these become unmanageable or concerning symptoms, please make sure to call or mychart.    Serious Side effects:   Pancreatitis (inflammation of the pancreas) has been associated with this type of medication, but is very rare.  If you have had pancreatitis in the past, this medication may not be for you.  Symptoms of pancreatitis include: Pain in your upper stomach area which may travel to your back and be worse after eating.     Storage:   make sure " that when you get the prescription that you store the prescription in the refrigerator until it is time to use the Wegovy pen.  Once it is time to use the Wegovy pen, you can keep the pen at room temperature and it is good for up to 28 days at room temperature.     How to inject:  For a video on how to use the pen please go to:  https://www.[employer]/about-wegovy/how-to-use-the-wegovy-pen.html#itemTwo     For any questions or concerns please send a [employer] message to our team or call our weight management call center at [phone] during regular business hours. For questions during evenings or weekends your messages will be addressed during the next business day.  For emergencies please call 911 or seek immediate medical care.       BTW:  I am now going to use pts starting BMI for their diagnosis code.  Since obesity is a disease we should be using this every time and then updating the BMI code.  Similar to blood pressure.  We continue to code for HTN even if their BP is 120/80.

## 2023-09-12 ENCOUNTER — TELEPHONE (OUTPATIENT)
Dept: SURGERY | Facility: CLINIC | Age: 58
End: 2023-09-12
Payer: COMMERCIAL

## 2023-09-12 NOTE — TELEPHONE ENCOUNTER
Prior Authorization Retail Medication Request    Medication/Dose: PA:  Semaglutide-Weight Management (WEGOVY) 0.25 MG/0.5ML pen 2 mL 0 9/7/2023  --  Sig - Route: Inject 0.25 mg Subcutaneous once a week - Subcutaneous  Semaglutide-Weight Management (WEGOVY) 0.5 MG/0.5ML pen 2 mL 0 9/7/2023  --  Sig - Route: Inject 0.5 mg Subcutaneous once a week - Subcutaneous  Semaglutide-Weight Management (WEGOVY) 1 MG/0.5ML pen 2 mL 0 9/7/2023  --  Sig - Route: Inject 1 mg Subcutaneous once a week - Subcutaneous      ICD code (if different than what is on RX):   [E66.09, Z68.30]   Previously Tried and Failed:  Qsymia, Naltrexone, diet/lifestyle  Rationale:  Weight management    Insurance Name:  [email or web address]   Insurance ID:  [id number]        Pharmacy Information (if different than what is on RX)  Name:    [id number] - [place] - [address] AT [facility] [address]     Phone:  [phone]

## 2023-09-13 ENCOUNTER — APPOINTMENT (OUTPATIENT)
Dept: ULTRASOUND IMAGING | Facility: CLINIC | Age: 58
End: 2023-09-13
Attending: PHYSICIAN ASSISTANT
Payer: COMMERCIAL

## 2023-09-13 ENCOUNTER — APPOINTMENT (OUTPATIENT)
Dept: CT IMAGING | Facility: CLINIC | Age: 58
End: 2023-09-13
Attending: PHYSICIAN ASSISTANT
Payer: COMMERCIAL

## 2023-09-13 ENCOUNTER — OFFICE VISIT (OUTPATIENT)
Dept: URGENT CARE | Facility: URGENT CARE | Age: 58
End: 2023-09-13
Payer: COMMERCIAL

## 2023-09-13 ENCOUNTER — HOSPITAL ENCOUNTER (EMERGENCY)
Facility: CLINIC | Age: 58
Discharge: HOME | End: 2023-09-13
Attending: PHYSICIAN ASSISTANT | Admitting: PHYSICIAN ASSISTANT
Payer: COMMERCIAL

## 2023-09-13 VITALS
HEART RATE: 80 BPM | OXYGEN SATURATION: 100 % | BODY MASS INDEX: 29.73 KG/M2 | TEMPERATURE: 97 F | SYSTOLIC BLOOD PRESSURE: 128 MMHG | HEIGHT: 66 IN | WEIGHT: 185 LBS | DIASTOLIC BLOOD PRESSURE: 84 MMHG | RESPIRATION RATE: 16 BRPM

## 2023-09-13 VITALS
DIASTOLIC BLOOD PRESSURE: 66 MMHG | TEMPERATURE: 98.1 F | HEART RATE: 74 BPM | SYSTOLIC BLOOD PRESSURE: 138 MMHG | OXYGEN SATURATION: 99 %

## 2023-09-13 DIAGNOSIS — R31.9 HEMATURIA: ICD-10-CM

## 2023-09-13 DIAGNOSIS — R10.30 LOWER ABDOMINAL PAIN: ICD-10-CM

## 2023-09-13 DIAGNOSIS — R31.0 HEMATURIA, GROSS: ICD-10-CM

## 2023-09-13 DIAGNOSIS — R10.2 SUPRAPUBIC ABDOMINAL PAIN: Primary | ICD-10-CM

## 2023-09-13 DIAGNOSIS — R93.89 ABNORMAL PELVIC ULTRASOUND: ICD-10-CM

## 2023-09-13 LAB
ALBUMIN UR-MCNC: ABNORMAL MG/DL
ANION GAP SERPL CALCULATED.3IONS-SCNC: 11 MMOL/L (ref 7–15)
APPEARANCE UR: CLEAR
BACTERIA #/AREA URNS HPF: ABNORMAL /HPF
BILIRUB UR QL STRIP: NEGATIVE
BUN SERPL-MCNC: 12.5 MG/DL (ref 6–20)
CALCIUM SERPL-MCNC: 9.2 MG/DL (ref 8.6–10)
CHLORIDE SERPL-SCNC: 104 MMOL/L (ref 98–107)
CK SERPL-CCNC: 68 U/L (ref 26–192)
COLOR UR AUTO: YELLOW
CREAT SERPL-MCNC: 0.61 MG/DL (ref 0.51–0.95)
DEPRECATED HCO3 PLAS-SCNC: 27 MMOL/L (ref 22–29)
EGFRCR SERPLBLD CKD-EPI 2021: >90 ML/MIN/1.73M2
ERYTHROCYTE [DISTWIDTH] IN BLOOD BY AUTOMATED COUNT: 12.8 % (ref 10–15)
GLUCOSE SERPL-MCNC: 93 MG/DL (ref 70–99)
GLUCOSE UR STRIP-MCNC: NEGATIVE MG/DL
HCT VFR BLD AUTO: 39.9 % (ref 35–47)
HGB BLD-MCNC: 13.4 G/DL (ref 11.7–15.7)
HGB UR QL STRIP: ABNORMAL
KETONES UR STRIP-MCNC: NEGATIVE MG/DL
LEUKOCYTE ESTERASE UR QL STRIP: ABNORMAL
MCH RBC QN AUTO: 31.8 PG (ref 26.5–33)
MCHC RBC AUTO-ENTMCNC: 33.6 G/DL (ref 31.5–36.5)
MCV RBC AUTO: 95 FL (ref 78–100)
NITRATE UR QL: NEGATIVE
PH UR STRIP: 5.5 [PH] (ref 5–7)
PLATELET # BLD AUTO: 256 10E3/UL (ref 150–450)
POTASSIUM SERPL-SCNC: 4.1 MMOL/L (ref 3.4–5.3)
RBC # BLD AUTO: 4.21 10E6/UL (ref 3.8–5.2)
RBC #/AREA URNS AUTO: ABNORMAL /HPF
SODIUM SERPL-SCNC: 142 MMOL/L (ref 136–145)
SP GR UR STRIP: <=1.005 (ref 1–1.03)
SQUAMOUS #/AREA URNS AUTO: ABNORMAL /LPF
UROBILINOGEN UR STRIP-ACNC: 0.2 E.U./DL
WBC # BLD AUTO: 9.4 10E3/UL (ref 4–11)
WBC #/AREA URNS AUTO: ABNORMAL /HPF

## 2023-09-13 PROCEDURE — 76856 US EXAM PELVIC COMPLETE: CPT

## 2023-09-13 PROCEDURE — 85027 COMPLETE CBC AUTOMATED: CPT | Performed by: EMERGENCY MEDICINE

## 2023-09-13 PROCEDURE — 82374 ASSAY BLOOD CARBON DIOXIDE: CPT | Performed by: EMERGENCY MEDICINE

## 2023-09-13 PROCEDURE — 81001 URINALYSIS AUTO W/SCOPE: CPT | Performed by: EMERGENCY MEDICINE

## 2023-09-13 PROCEDURE — 99204 OFFICE O/P NEW MOD 45 MIN: CPT | Performed by: EMERGENCY MEDICINE

## 2023-09-13 PROCEDURE — 82550 ASSAY OF CK (CPK): CPT | Performed by: PHYSICIAN ASSISTANT

## 2023-09-13 PROCEDURE — 250N000009 HC RX 250: Performed by: PHYSICIAN ASSISTANT

## 2023-09-13 PROCEDURE — 99285 EMERGENCY DEPT VISIT HI MDM: CPT | Mod: 25

## 2023-09-13 PROCEDURE — 74177 CT ABD & PELVIS W/CONTRAST: CPT

## 2023-09-13 PROCEDURE — 250N000011 HC RX IP 250 OP 636: Performed by: PHYSICIAN ASSISTANT

## 2023-09-13 PROCEDURE — 36415 COLL VENOUS BLD VENIPUNCTURE: CPT | Performed by: EMERGENCY MEDICINE

## 2023-09-13 RX ORDER — IOPAMIDOL 755 MG/ML
93 INJECTION, SOLUTION INTRAVASCULAR ONCE
Status: COMPLETED | OUTPATIENT
Start: 2023-09-13 | End: 2023-09-13

## 2023-09-13 RX ADMIN — SODIUM CHLORIDE 67 ML: 9 INJECTION, SOLUTION INTRAVENOUS at 18:29

## 2023-09-13 RX ADMIN — IOPAMIDOL 93 ML: 755 INJECTION, SOLUTION INTRAVENOUS at 18:29

## 2023-09-13 ASSESSMENT — ACTIVITIES OF DAILY LIVING (ADL)
ADLS_ACUITY_SCORE: 37
ADLS_ACUITY_SCORE: 35

## 2023-09-13 NOTE — ED PROVIDER NOTES
History     Chief Complaint:  Hematuria and Abdominal Pain       HPI   Linda Mina is a 58 year old female who presents to the ED with lower abdominal pain/pressure and hematuria. Patient states that she first noticed the abdominal pain and pressure last night. She says the pressure is a constant sensation, though her pain is intermittent. She also had the urge to urinate last night, but was unable to. Linda had similar symptoms this morning and adds that when she urinated today, her urine was bright pink in color. She also noticed a pink color on the toilet paper after wiping. No pain or vaginal irritation with wiping and no vaginal discharge or itching.  Patient has not had any recent dysuria, strong odor to her urine, or other urinary symptoms. Denies vaginal discharge, rectal pain, rash or lesions to the perineal area, and back pain. No recent fevers, cough, or cold symptoms. She has not had any trauma to the perineal area. She has never experienced these symptoms before. Linda did not take any analgesics prior to arrival. Patient is not anticoagulated. She has a history of UTIs and endometrial ablations and states she has not had her menstrual cycle for a while.     Of note, patient had a rectocele repaired at [place] about one year ago and was concerned this may have returned, though she was unable to see anything protruding from her perineum today. She called [facility] this morning and was referred to urgent care, where a UA was performed and returned normal. She was recommended to be seen in the ED for further evaluation.     Independent Historian:   None - Patient Only    Review of External Notes:   Reviewed UC note from today showing no micro RBCs.  5 wbc, trace leuk esterace few squamous o/w negative.     Medications:    Nexium  Diflucan  Synthroid  Nasonex  Depade  Deltasone  Qysmia   Claritin   Adderall     Past Medical History:    Carpal tunnel syndrome of right wrist  Migraines  Hypothyroidism   Sleep  "apnea  Synovial cyst of popliteal space  Vitamin D deficiency   Class 1 obesity   GERD  Telogen effluvium   Attention and concentration deficit   Seasonal allergic rhinitis   Adjustment disorder with depressed mood   Toxic diffuse goiter  MARIANA  Rectal prolapse     Past Surgical History:    Arthroplasty knee unicompartmental, bilateral  Arthroscopy shoulder decompression, bilateral  Rotator cuff repair, left  Bunionectomy  Carpal tunnel release, right  Colonoscopy x2  Laparoscopic appendectomy  Mammoplasty augmentation   Tubal ligation, bilateral   Endometrial ablation   [place] teeth extraction   Cataract extraction with intraocular lens implant, bilateral  D & C  Colporrhaphy posterior    Physical Exam   Patient Vitals for the past 24 hrs:   BP Temp Temp src Pulse Resp SpO2 Height Weight   09/13/23 1800 128/84 -- -- 80 16 100 % -- --   09/13/23 1444 (!) 168/88 97  F (36.1  C) Temporal 83 18 98 % 1.664 m (5' 5.5\") 83.9 kg (185 lb)        Physical Exam  General: Awake, alert, non-toxic.  Head:  Scalp is NC/AT  Eyes:  Conjunctiva normal, PERRL  ENT:  The external nose and ears are normal.   Neck:  Normal range of motion without rigidity.  CV:  Regular rate and rhythm    No pathologic murmur, rubs, or gallops.  Resp:  Breath sounds are clear bilaterally    Non-labored, no retractions or accessory muscle use  Abdomen: Abdomen is soft, no distension, no tenderness, no masses. No CVA tenderness. No hemorrhoids or visualized blood from rectal area.  Pelvic: Mildly atrophic vulvovaginal mucosa, no other obvious lesions of concern.  No discharge seen. No visualized bleeding.  Cervix closed.   (Performed in presence of female chaperone Jeni RN)    MS:  No lower extremity edema/swelling.   Skin:  Warm and dry, No rash or lesions noted.  Neuro:  Alert and oriented.  GCS 15 Moves all extremities normal.  No facial asymmetry. Gait normal.  Psych:  Awake. Alert. Normal affect. Appropriate interactions.      Emergency Department " Course     Imaging:  US Pelvic Complete with Transvaginal   Final Result   IMPRESSION:   1.  Status post endometrial ablation with two subcentimeter hyperechoic avascular foci near the uterine fundus favored to represent residual islands of the endometrium versus post ablation change. Correlation with prior outside ultrasounds, if available,    would be helpful.   2.  Normal sonographic appearance of left ovary.    3.  Right ovary is obscured by shadowing bowel gas.      Abd/pelvis CT,  IV  contrast only TRAUMA / AAA   Final Result   IMPRESSION:    1.  No acute abnormalities in the abdomen or pelvis.         Report per radiology    Laboratory:  Labs Ordered and Resulted from Time of ED Arrival to Time of ED Departure   CBC WITH PLATELETS - Normal       Result Value    WBC Count 9.4      RBC Count 4.21      Hemoglobin 13.4      Hematocrit 39.9      MCV 95      MCH 31.8      MCHC 33.6      RDW 12.8      Platelet Count 256     BASIC METABOLIC PANEL - Normal    Sodium 142      Potassium 4.1      Chloride 104      Carbon Dioxide (CO2) 27      Anion Gap 11      Urea Nitrogen 12.5      Creatinine 0.61      Calcium 9.2      Glucose 93      GFR Estimate >90     CK TOTAL - Normal    CK 68          Emergency Department Course & Assessments:       Interventions:  Medications   iopamidol (ISOVUE-370) solution 93 mL (93 mLs Intravenous $Given 23)   Saline Flush (67 mLs Intravenous $Given 23)        Assessments:   I obtained history and examined the patient as noted above. I also performed a pelvic exam at this time.    I rechecked the patient and explained findings.    Independent Interpretation (X-rays, CTs, rhythm strip):  None    Consultations/Discussion of Management or Tests:  None     Social Determinants of Health affecting care:   None    Disposition:  The patient was discharged to home.     Impression & Plan    CMS Diagnoses: None    Medical Decision Makin yo female reporting bleeding in  urine since last night and with wiping.  No clear etiology found at this time.  Reviewed UA from urgent care does not appear bloody nor convincing for evidence of infection and no symptoms to suggest infection.  Sounds like they cultured at their would hold off on any kind of antibiotic treatment as doubt UTI.  CT scan abdomen pelvis negative for any acute abnormality or structural etiology, no stones.  Pelvic exam without evidence of vaginal bleeding pelvic ultrasound does show anomaly of uterine lining likely due to prior endometrial ablation and patient affirms this but understands that she needs to discuss with her urogynecologist to ensure that this is consistent with prior findings and imaging as study is not available for direct comparison by her urologist.  No evidence of pelvic organ prolapse.  No signs of rectal bleeding or GI bleed.      Does have a little bit of atrophy to the vulvovaginal mucosa but does not sound like yeast, BV, STI etc. difficult to say whether this is at all related to her symptoms.  Patient well-appearing without signs of active bleeding on exam at this time CBC kidney function unremarkable.  CK normal not myoglobinuria.  Given that she has a [facility] urogynecologist I think that would be a perfect person to follow-up with his I am still little bit uncertain the source of her symptoms.  She will return if worsening pain fever increased bleeding etc.  She understands the importance of further evaluation as may require further testing with cystoscopy or hysteroscopy etc. to exclude occult lesion not seen on imaging      Diagnosis:    ICD-10-CM    1. Lower abdominal pain  R10.30       2. Hematuria  R31.9       3. Abnormal pelvic ultrasound  R93.89     Likely secondary to prior procedure but discuss with Dr. Zahra Covington Disclosure:  I, Yesenia Thomas, am serving as a scribe at 5:32 PM on 9/13/2023 to document services personally performed by Asad Anderson PA-C  based on my observations and the provider's statements to me.   9/13/2023   Asad Anderson PA-C Etten, Clark Ellsworth, PA-C  09/14/23 1148

## 2023-09-13 NOTE — ED TRIAGE NOTES
Lower abdomen pain since yesterday and noted blood in urine this morning. Reports history of rectocele repair done last October. Pain is getting worse.      Triage Assessment       Row Name 09/13/23 1945       Triage Assessment (Adult)    Airway WDL WDL       Respiratory WDL    Respiratory WDL WDL       Skin Circulation/Temperature WDL    Skin Circulation/Temperature WDL WDL       Cardiac WDL    Cardiac WDL WDL       Peripheral/Neurovascular WDL    Peripheral Neurovascular WDL WDL       Cognitive/Neuro/Behavioral WDL    Cognitive/Neuro/Behavioral WDL WDL

## 2023-09-13 NOTE — PROGRESS NOTES
Assessment & Plan     Diagnosis:    ICD-10-CM    1. Persistent gross hematuria  R31.0 UA Macroscopic with reflex to Microscopic and Culture - Clinic Collect     UA Microscopic with Reflex to Culture      2. Suprapubic abdominal pain  R10.2           Medical Decision Making:  Patient with history of rectocele prolapse s/p repair in October 2022 presented to urgent care with suprapubic abdominal pain and gross hematuria. The clinical exam today is non-specific. The exact etiology of the abdominal pain is not clear at this time. The differential diagnosis of abdominal pain includes: Kidney stone, cystitis, pyelonephritis, diverticulitis, cystocele, rectocele, bladder hemorrhage, Ischemia, amongst other etiologies.  UA without signs of infection, there is a large amount of blood and is it darker in color on macro,  interestingly the micro shows only 0-2 RBCs and a 5-10 WBCs which is perplexing. Patient is postmenopausal.  Given patient's moderate distress, concern for progressively worsening abdominal pain in the setting of gross hematuria; I instructed the patient to go to the ER now for further evaluation. Patient voices understanding and agreement with the plan. All questions answered.    Kaushal Swenson PA-C  [facility]    Subjective     Linda Mina is a 58 year old female who presents to clinic today for the following health issues:  Chief Complaint   Patient presents with    Urinary Problem     Immense/constant pressure, discomfort with urination blood in urine started today. Rectal seal repair Oct 2022         HPI  Patient states that for the past few hours she has been having immense constant pressure in the lower abdomen, large amount of blood in her urine, symptoms have been getting progressively worse.  She notes she has a history of rectocele repair in October 2022.  She notes some discomfort with urination, but does not feel like a UTI.  She is more concerned about the pressure and  pain in her bladder region that is getting progressively worse.  She has had some back pain over the last couple of weeks, in the low back on both sides, may be more so on the right.  No history of kidney stones.  She is post menopausal.  She denies any fever, nausea, vomiting, diarrhea, inability to urinate, vaginal discharge/bleeding.    Review of Systems    See HPI    Objective      Vitals:    Patient Vitals for the past 24 hrs:   BP Temp Temp src Pulse SpO2   09/13/23 1355 138/66 98.1  F (36.7  C) Tympanic 74 99 %         Vital signs reviewed by: Kaushal Swenson PA-C    Physical Exam   Constitutional: The patient is oriented to person, place, and time. Alert and cooperative. Moderate acute distress.  Mouth: Mucous membranes are moist.  Cardiovascular: Regular rate and rhythm.  Pulmonary/Chest: Effort normal. No respiratory distress.   GI: Abdomen is tender and slightly distended in the suprapubic region. No rebound or guarding. No McBurney point tenderness.   MSK: No CVA tenderness bilaterally.  Neurological: Alert and oriented x3.     Labs/Imaging:    Results for orders placed or performed in visit on 09/13/23   UA Macroscopic with reflex to Microscopic and Culture - Clinic Collect     Status: Abnormal    Specimen: Urine, Midstream   Result Value Ref Range    Color Urine Yellow Colorless, Straw, Light Yellow, Yellow    Appearance Urine Clear Clear    Glucose Urine Negative Negative mg/dL    Bilirubin Urine Negative Negative    Ketones Urine Negative Negative mg/dL    Specific Gravity Urine <=1.005 1.003 - 1.035    Blood Urine Large (A) Negative    pH Urine 5.5 5.0 - 7.0    Protein Albumin Urine Trace (A) Negative mg/dL    Urobilinogen Urine 0.2 0.2, 1.0 E.U./dL    Nitrite Urine Negative Negative    Leukocyte Esterase Urine Small (A) Negative          Kaushal Swenson PA-C, September 13, 2023

## 2023-09-14 NOTE — TELEPHONE ENCOUNTER
Prior Authorization Approval    Medication: WEGOVY 0.25 MG/0.5ML SC SOAJ  Authorization Effective Date: 9/14/2023  Authorization Expiration Date: 4/11/2024  Approved Dose/Quantity: 2/28  Reference #: [id number]   Insurance Company: [employer] - Phone [phone] Fax [phone]  Expected CoPay:       CoPay Card Available:      Financial Assistance Needed:   Which Pharmacy is filling the prescription: [facility] #[id number] - [place] - [address] AT [facility] [address]  Pharmacy Notified: Yes  Patient Notified: Yes

## 2023-09-14 NOTE — TELEPHONE ENCOUNTER
PA Initiation    Medication: WEGOVY 0.25 MG/0.5ML SC SOAJ  Insurance Company: [email or web address] - Phone [phone] Fax [phone]  Pharmacy Filling the Rx: [facility] #[id number] - [place] - [address] AT [facility] [address]  Filling Pharmacy Phone: [phone]  Filling Pharmacy Fax:    Start Date: 9/14/2023

## 2023-09-25 ENCOUNTER — TELEPHONE (OUTPATIENT)
Dept: SURGERY | Facility: CLINIC | Age: 58
End: 2023-09-25
Payer: COMMERCIAL

## 2023-09-25 DIAGNOSIS — E66.811 CLASS 1 OBESITY DUE TO EXCESS CALORIES WITHOUT SERIOUS COMORBIDITY WITH BODY MASS INDEX (BMI) OF 30.0 TO 30.9 IN ADULT: ICD-10-CM

## 2023-09-25 DIAGNOSIS — E66.09 CLASS 1 OBESITY DUE TO EXCESS CALORIES WITHOUT SERIOUS COMORBIDITY WITH BODY MASS INDEX (BMI) OF 30.0 TO 30.9 IN ADULT: ICD-10-CM

## 2023-09-25 NOTE — TELEPHONE ENCOUNTER
Received call from pt re: No luck finding Wegovy starting does and has been off all AOMs for 2 weeks.  Per pt report, she is requesting to be on something rather than nothing while she awaits the Wegovy starter doses to come back in stock.  Pt is told that her local retail pharmacies will not have anything in stock for a few months due to back orders and is worried she will lose progress in her weight loss if she is on nothing.  Would like to know if Carmela ZAMUDIO can put her on something while she waits to begin Wegovy.    Has previously been on Qsymia and naltrexone but is open to hearing what Carmela ZAMUDIO recommends.    Next appt 10/26.    Will route to provider for additional AOM recommendations.    Christina YEN RN

## 2023-09-26 RX ORDER — PHENTERMINE AND TOPIRAMATE 7.5; 46 MG/1; MG/1
1 CAPSULE, EXTENDED RELEASE ORAL EVERY MORNING
Qty: 90 CAPSULE | Refills: 0 | Status: SHIPPED | OUTPATIENT
Start: 2023-09-26 | End: 2023-10-26

## 2023-09-26 RX ORDER — NALTREXONE HYDROCHLORIDE 50 MG/1
TABLET, FILM COATED ORAL
Qty: 30 TABLET | Refills: 2 | Status: SHIPPED | OUTPATIENT
Start: 2023-09-26 | End: 2023-10-26

## 2023-09-26 NOTE — TELEPHONE ENCOUNTER
Recommend we go back to combo of Qsymia and naltrexone.    If she is able to find Wegovy, then we can start those at that time.     AOM Considerations:  Phentermine:  Tolerated Qsymia  Topiramate:  Tolerated Qsymia  GLP-1:    Can't find in stock  Naltrexone:  Started for cravings in July  Wellbutrin:    Metformin:    Contrave: Not covered  Qsymia: Worked but plateaued and wanted other options     BP Readings from Last 6 Encounters:   09/13/23 128/84   09/13/23 138/66   06/08/21 124/80   02/08/21 130/78   02/01/21 116/64   10/19/20 118/74       Pulse Readings from Last 6 Encounters:   09/13/23 80   09/13/23 74   07/26/22 86   02/01/21 78   09/28/20 84   09/25/19 84              Notes from 7/18/2023:     Last seen on 4/11/2023.  On Qsymia.  Patient is pleased with weight loss so far but is frustrated with weight plateau. Would like to stop Qsymia and discuss other weight loss medications options.  Would like to try GLP-1.  Still having trouble waking up early from sleep and has trouble getting back to sleep.      Hunger controlled.  Eating 3 meals healthy meals a day.  Had sugar during birthday and is having some difficulty getting off this.  Cravings occur daily in late afternoon/evening.       Saw RD on 7/10/2023.    Goals:  Consider strength training one time per week (has workouts from )- not met yet  Aim for a total of  150 minutes of cardio per week- partially met     ROS:     HEENT  H/O glaucoma:            no  Cardiovascular  CAD:                            no  Palpitations:                No, Echo WNL following use of Phen-fen.    HTN:                            no  Gastrointestinal  GERD:                         no  Constipation:               no  Gastroparesis:            no  Liver Dz:                      no  H/O Pancreatitis:         no  H/O Gallbladder Dz:    no  Psychiatric  Moods Stable:             yes  Anxiety:                       yes  Depression:                 yes  Bipolar:                         no  H/O ETOH/Drug Use  no  H/O eating disorder:    yes  Endocrine  PMH/FMH of MTC or MEN2:  no  Neurologic:  H/O seizures:              no  Headaches:                 Yes, migraine headaches once a year  Memory Impairment:   no    H/O kidney stones:     no  Kidney disease:          no  Current birth control: Menopause

## 2023-10-04 ASSESSMENT — ENCOUNTER SYMPTOMS
NAUSEA: 0
BREAST MASS: 0
PARESTHESIAS: 0
ARTHRALGIAS: 0
WEAKNESS: 0
SORE THROAT: 0
HEMATURIA: 0
HEARTBURN: 0
MYALGIAS: 0
DIARRHEA: 0
HEMATOCHEZIA: 0
DIZZINESS: 0
JOINT SWELLING: 0
ABDOMINAL PAIN: 0
CONSTIPATION: 0
CHILLS: 0
FEVER: 0
SHORTNESS OF BREATH: 0
HEADACHES: 0
NERVOUS/ANXIOUS: 0
PALPITATIONS: 0
FREQUENCY: 0
EYE PAIN: 0
DYSURIA: 0
COUGH: 0

## 2023-10-05 NOTE — PROGRESS NOTES
Linda is a 58 year old  female who presents for annual exam.     Besides routine health maintenance, she has no other health concerns today .    HPI: here for annual exam.  She is menopausal, no HRT.  She did have rectocele repaired [place] at [place] last  year [place] and has done well since surgery.    She got a dog bite and is on Augmentin right now, so wanting diflucan because always gets a yeast infection.  All blood work done with PCP.  Mammogram at [facility].    The patient's PCP is  Dano Stuart MD.        GYNECOLOGIC HISTORY:    No LMP recorded. Patient has had an ablation.  Her current contraception method is: menopause.  She  reports that she quit smoking about 21 years ago. Her smoking use included cigarettes. She has never used smokeless tobacco.  Patient is sexually active.  STD testing offered?  Declined  Last PHQ-9 score on record =       10/9/2023     9:12 AM   PHQ-9 SCORE   PHQ-9 Total Score 0     Last GAD7 score on record =       10/9/2023     9:12 AM   MARIANA-7 SCORE   Total Score 0     Alcohol Score = 1    HEALTH MAINTENANCE:  Cholesterol: Found in care Everywhere 3/14/22   Last Mammo:  23 , Result: Normal, Next Mammo: 2024   Pap:   Lab Results   Component Value Date    PAP NIL/HPV- 10/19/2020    PAP NIL/HPV- 2019    PAP NIL/HPV- 2018      Colonoscopy:  20, Result: polyps, Next Colonoscopy: 3-5 years.  Dexa:  16   Lumbar Spine (L1-L4) T-score:  0.9               Left Femoral Neck T-score:  0.0               Right Femoral Neck T-score:  -0.3               Forearm (radius 33%) T-score:  -0.1     Lumbar (L1-L4) BMD: 1.307   Previous:           Total Hip Mean BMD: 1.000   Previous:      Impression:  Normal bone density study.  Health maintenance updated:  yes    HISTORY:  OB History    Para Term  AB Living   2 2 2 0 0 2   SAB IAB Ectopic Multiple Live Births   0 0 0 0 2      # Outcome Date GA Lbr Valentin/2nd Weight Sex Delivery Anes PTL Lv   2  Term      Vag-Spont   RENETTA   1 Term      Vag-Spont   RENETTA       Patient Active Problem List   Diagnosis    Status post bilateral knee replacements    Class 1 obesity due to excess calories without serious comorbidity with body mass index (BMI) of 30.0 to 30.9 in adult    Gastroesophageal reflux disease, unspecified whether esophagitis present    Dry mouth    Telogen effluvium     Past Surgical History:   Procedure Laterality Date    ARTHROPLASTY KNEE UNICOMPARTMENT  2013    Procedure: ARTHROPLASTY KNEE UNICOMPARTMENT;  BILATERAL UNICOMPARTMENTAL  KNEE ARTHROPLASTY (NANDA)^ ;  Surgeon: Jimbo Brown MD;  Location:  OR    ARTHROSCOPY SHOULDER DECOMPRESSION      right and left    BUNIONECTOMY      CARPAL TUNNEL RELEASE RT/LT      COLONOSCOPY N/A 2015    Procedure: COMBINED COLONOSCOPY, SINGLE OR MULTIPLE BIOPSY/POLYPECTOMY BY BIOPSY;  Surgeon: Dinorah Odom MD;  Location:  GI    COLONOSCOPY N/A 2020    Procedure: COLONOSCOPY, WITH POLYPECTOMY AND BIOPSY;  Surgeon: Loulou Correa MD;  Location:  GI    COSMETIC SURGERY      breast implant removal    LAPAROSCOPIC APPENDECTOMY N/A 10/21/2015    Procedure: LAPAROSCOPIC APPENDECTOMY;  Surgeon: Dinorah Odom MD;  Location:  SD    MAMMOPLASTY AUGMENTATION      RECTOCELE REPAIR  10/04/2022      Social History     Tobacco Use    Smoking status: Former     Types: Cigarettes     Quit date: 2001     Years since quittin.9    Smokeless tobacco: Never   Substance Use Topics    Alcohol use: Yes     Comment: 2 glasses of wine 3 times a week      Problem (# of Occurrences) Relation (Name,Age of Onset)    Other Cancer (1) Father    No Known Problems (7) Mother, Sister, Brother, Maternal Grandmother, Maternal Grandfather, Paternal Grandmother, Other              Current Outpatient Medications   Medication Sig    Cholecalciferol (VITAMIN D3 PO) Take 2,000 Units by mouth daily     esomeprazole (NEXIUM) 40 MG DR capsule Take 1 capsule  "(40 mg) by mouth 2 times daily Take 30-60 minutes before eating.    fluconazole (DIFLUCAN) 150 MG tablet Take 1 tablet (150 mg) by mouth every 3 days    LUTEIN PO Take 1 tablet by mouth daily    mometasone (NASONEX) 50 MCG/ACT nasal spray Spray 2 sprays into both nostrils daily    multivitamin w/minerals (THERA-VIT-M) tablet Take 1 tablet by mouth daily    naltrexone (DEPADE/REVIA) 50 MG tablet Take 1/2 tablet 1-2 hours before biggest craving time for 7 days, then increase to a full tablet    Phentermine-Topiramate (QSYMIA) 7.5-46 MG CP24 Take 1 capsule by mouth every morning    VITAMIN E NATURAL PO Take by mouth daily     levothyroxine (SYNTHROID/LEVOTHROID) 112 MCG tablet TK 1 T PO QD    predniSONE (DELTASONE) 10 MG tablet Take 10 mg by mouth (Patient not taking: Reported on 10/9/2023)    Semaglutide-Weight Management (WEGOVY) 0.25 MG/0.5ML pen Inject 0.25 mg Subcutaneous once a week (Patient not taking: Reported on 10/9/2023)    Semaglutide-Weight Management (WEGOVY) 0.5 MG/0.5ML pen Inject 0.5 mg Subcutaneous once a week (Patient not taking: Reported on 10/9/2023)    Semaglutide-Weight Management (WEGOVY) 1 MG/0.5ML pen Inject 1 mg Subcutaneous once a week (Patient not taking: Reported on 10/9/2023)     No current facility-administered medications for this visit.     Allergies   Allergen Reactions    Codeine      PN: LW Reaction: nausea/vomitting  Other reaction(s): *Unknown  PN: LW Reaction: nausea/vomitting       Past medical, surgical, social and family histories were reviewed and updated in EPIC.    ROS:   12 point review of systems negative other than symptoms noted below or in the HPI.  No urinary frequency or dysuria, bladder or kidney problems    EXAM:  /82   Ht 1.651 m (5' 5\")   Wt 89.8 kg (198 lb)   BMI 32.95 kg/m     BMI: Body mass index is 32.95 kg/m .    PHYSICAL EXAM:  Constitutional:   Appearance: Well nourished, well developed, alert, in no acute distress  Neck:  Lymph Nodes:  No " lymphadenopathy present    Thyroid:  Gland size normal, nontender, no nodules or masses present  on palpation  Chest:  Respiratory Effort:  Breathing unlabored  Cardiovascular:    Heart: Auscultation:  Regular rate, normal rhythm, no murmurs present  Breasts: Inspection of Breasts:  No lymphadenopathy present., Palpation of Breasts and Axillae:  No masses present on palpation, no breast tenderness., Axillary Lymph Nodes:  No lymphadenopathy present., and No nodularity, asymmetry or nipple discharge bilaterally.  Gastrointestinal:   Abdominal Examination:  Abdomen nontender to palpation, tone normal without rigidity or guarding, no masses present, umbilicus without lesions   Liver and Spleen:  No hepatomegaly present, liver nontender to palpation    Hernias:  No hernias present  Lymphatic: Lymph Nodes:  No other lymphadenopathy present  Skin:  General Inspection:  No rashes present, no lesions present, no areas of  discoloration  Neurologic:    Mental Status:  Oriented X3.  Normal strength and tone, sensory exam                grossly normal, mentation intact and speech normal.    Psychiatric:   Mentation appears normal and affect normal/bright.         Pelvic Exam:  External Genitalia:     Normal appearance for age, no discharge present, no tenderness present, no inflammatory lesions present, color normal  Vagina:     Normal vaginal vault without central or paravaginal defects, no discharge present, no inflammatory lesions present, no masses present  Bladder:     Nontender to palpation  Urethra:   Urethral Body:  Urethra palpation normal, urethra structural support normal   Urethral Meatus:  No erythema or lesions present  Cervix:     Appearance healthy, no lesions present, nontender to palpation, no bleeding present  Uterus:     Uterus: firm, normal sized and nontender, anteverted in position.   Adnexa:     No adnexal tenderness present, no adnexal masses present  Perineum:     Perineum within normal limits, no  evidence of trauma, no rashes or skin lesions present  Anus:     Anus within normal limits, no hemorrhoids present  Inguinal Lymph Nodes:     No lymphadenopathy present  Pubic Hair:     Normal pubic hair distribution for age  Genitalia and Groin:     No rashes present, no lesions present, no areas of discoloration, no masses present    COUNSELING:   Reviewed preventive health counseling, as reflected in patient instructions       Regular exercise       Healthy diet/nutrition       Osteoporosis prevention/bone health       Colorectal Cancer Screening       (Kimmie)menopause management    BMI: Body mass index is 32.95 kg/m .  Weight management plan: Discussed healthy diet and exercise guidelines    ASSESSMENT:  58 year old female with satisfactory annual exam.    ICD-10-CM    1. Encounter for gynecological examination without abnormal finding  Z01.419       2. Screening for cervical cancer  Z12.4 Pap thin layer screen with HPV - recommended age 30 - 65 years      3. Yeast infection of the vagina  B37.31 fluconazole (DIFLUCAN) 150 MG tablet          PLAN:  Normal Gyn exam.    Return prn or 1 year for annual and pap.    CARLOS Escalante CNP

## 2023-10-09 ENCOUNTER — OFFICE VISIT (OUTPATIENT)
Dept: OBGYN | Facility: CLINIC | Age: 58
End: 2023-10-09
Payer: COMMERCIAL

## 2023-10-09 VITALS
BODY MASS INDEX: 32.99 KG/M2 | DIASTOLIC BLOOD PRESSURE: 82 MMHG | SYSTOLIC BLOOD PRESSURE: 128 MMHG | WEIGHT: 198 LBS | HEIGHT: 65 IN

## 2023-10-09 DIAGNOSIS — Z01.419 ENCOUNTER FOR GYNECOLOGICAL EXAMINATION WITHOUT ABNORMAL FINDING: Primary | ICD-10-CM

## 2023-10-09 DIAGNOSIS — Z12.4 SCREENING FOR CERVICAL CANCER: ICD-10-CM

## 2023-10-09 DIAGNOSIS — B37.31 YEAST INFECTION OF THE VAGINA: ICD-10-CM

## 2023-10-09 PROCEDURE — 99396 PREV VISIT EST AGE 40-64: CPT | Performed by: NURSE PRACTITIONER

## 2023-10-09 PROCEDURE — G0145 SCR C/V CYTO,THINLAYER,RESCR: HCPCS | Performed by: NURSE PRACTITIONER

## 2023-10-09 PROCEDURE — 87624 HPV HI-RISK TYP POOLED RSLT: CPT | Performed by: NURSE PRACTITIONER

## 2023-10-09 RX ORDER — FLUCONAZOLE 150 MG/1
150 TABLET ORAL
Qty: 2 TABLET | Refills: 3 | Status: SHIPPED | OUTPATIENT
Start: 2023-10-09

## 2023-10-09 ASSESSMENT — ANXIETY QUESTIONNAIRES
3. WORRYING TOO MUCH ABOUT DIFFERENT THINGS: NOT AT ALL
6. BECOMING EASILY ANNOYED OR IRRITABLE: NOT AT ALL
5. BEING SO RESTLESS THAT IT IS HARD TO SIT STILL: NOT AT ALL
1. FEELING NERVOUS, ANXIOUS, OR ON EDGE: NOT AT ALL
IF YOU CHECKED OFF ANY PROBLEMS ON THIS QUESTIONNAIRE, HOW DIFFICULT HAVE THESE PROBLEMS MADE IT FOR YOU TO DO YOUR WORK, TAKE CARE OF THINGS AT HOME, OR GET ALONG WITH OTHER PEOPLE: NOT DIFFICULT AT ALL
GAD7 TOTAL SCORE: 0
7. FEELING AFRAID AS IF SOMETHING AWFUL MIGHT HAPPEN: NOT AT ALL
GAD7 TOTAL SCORE: 0
2. NOT BEING ABLE TO STOP OR CONTROL WORRYING: NOT AT ALL

## 2023-10-09 ASSESSMENT — PATIENT HEALTH QUESTIONNAIRE - PHQ9
SUM OF ALL RESPONSES TO PHQ QUESTIONS 1-9: 0
5. POOR APPETITE OR OVEREATING: NOT AT ALL

## 2023-10-11 LAB
BKR LAB AP GYN ADEQUACY: NORMAL
BKR LAB AP GYN INTERPRETATION: NORMAL
BKR LAB AP HPV REFLEX: NORMAL
BKR LAB AP PREVIOUS ABNORMAL: NORMAL
PATH REPORT.COMMENTS IMP SPEC: NORMAL
PATH REPORT.COMMENTS IMP SPEC: NORMAL
PATH REPORT.RELEVANT HX SPEC: NORMAL

## 2023-10-13 LAB
HUMAN PAPILLOMA VIRUS 16 DNA: NEGATIVE
HUMAN PAPILLOMA VIRUS 18 DNA: NEGATIVE
HUMAN PAPILLOMA VIRUS FINAL DIAGNOSIS: NORMAL
HUMAN PAPILLOMA VIRUS OTHER HR: NEGATIVE

## 2023-10-15 NOTE — PROGRESS NOTES
"Linda is a 58 year old who is being evaluated via a billable video visit.      The patient has been notified of following:     \"This video visit will be conducted via a call between you and your physician/provider. We have found that certain health care needs can be provided without the need for an in-person physical exam.  This service lets us provide the care you need with a video conversation.  If a prescription is necessary we can send it directly to your pharmacy.  If lab work is needed we can place an order for that and you can then stop by our lab to have the test done at a later time.    Video visits are billed at different rates depending on your insurance coverage.  Please reach out to your insurance provider with any questions.    If during the course of the call the physician/provider feels a video visit is not appropriate, you will not be charged for this service.\"    Patient has given verbal consent for Video visit? Yes    How would you like to obtain your AVS? MyChart    If the video visit is dropped, the invitation should be resent by: Text to cell phone: [phone]    Will anyone else be joining your video visit? No    I    Video-Visit Details    Type of service:  Video Visit    Video Start Time: 8:02 AM    Video End Time:8:29 AM    Originating Location (pt. Location): Home    Distant Location (provider location):  [facility] [place]     Platform used for Video Visit: BetterYou  10/26/2023      Return Medical Weight Management Note     Linda Mina  MRN:  [id number]  :  1965    Dear Dano Stuart MD,    I had the pleasure of seeing your patient Linda Mina. She is a 58 year old female who I am continuing to see for treatment of obesity related to:         No data to display                Assessment & Plan   Problem List Items Addressed This Visit       Class 1 obesity due to excess calories without serious comorbidity with body mass index (BMI) of " "31.0 to 31.9 in adult - Primary      Healthy habits to assist with further weight loss were discussed. Linda will discontinue qsymia since it is causing trouble with her sleep and subsequent concentration at work.  She will restart Wellbutrin.  When she went off weight started to creep up. Start Topiramate.  I ordered Wegovy   Once able to obtain the 0.25 mg,0.5 mg , and 1 mg dose \"12 weeks of therapy\"  she can begin treatment.  She will continue to walk for exercise and eat a balanced diet.           Relevant Medications    buPROPion (WELLBUTRIN XL) 150 MG 24 hr tablet    topiramate (TOPAMAX) 25 MG tablet    buPROPion (WELLBUTRIN XL) 300 MG 24 hr tablet    topiramate (TOPAMAX) 25 MG tablet    Semaglutide-Weight Management (WEGOVY) 1 MG/0.5ML pen    Semaglutide-Weight Management (WEGOVY) 0.5 MG/0.5ML pen    Semaglutide-Weight Management (WEGOVY) 0.25 MG/0.5ML pen        PATIENT INSTRUCTIONS:  Stop Qsymia    Start Topiramate (Remember to drink plenty of fluids daily)   Week 1:Take 1 tablet (25 mg) at bedtime  Week 2 Take 2 tablets (50 mg) at bedtime  Week 3:Take 3 tablets (75 mg) at bedtime Stay at 3 tabs until you are seen again.     Restart Wellbutrin 150 mg in the morning for 2 weeks, then increase to 300 mg.      I ordered Wegovy to your pharmacy.  You may need to be persistent and patient to get these initial dosages due to the shortage.  Once you are able to obtain the 0.25 mg,0.5 mg , and 1 mg dose \"12 weeks of therapy\" contact clinic via [facility] you can begin treatment.      FOLLOW-UP:  Please call [phone] to schedule your next visit in 3 months.     36 minutes spent on the date of the encounter doing chart review, history and exam, result review, counseling, developing plan of care, documentation, and further activities as noted      INTERVAL HISTORY:  Linda returns for medical weight management follow up.  Last seen on 7/18/2023. Ordered Wegovy but pt unable to find. Was off Qsymia for a month when " trying to find it.  Gained weight during this time.  Restarted qsymia. Qsymia is not doing much.  She is not sleeping well.  Is waking up in the middle of the night and is not feeling rested. CPAP data is all over the place.    AOM Considerations:  Phentermine:   Tolerated Qsymia  Topiramate:     Tolerated Qsymia, Was off for a month when trying to find Wegovy. After restarting has had trouble with sleep and now concentration at work.   GLP-1:              Can't find in stock, has not started.   Naltrexone:      Started for cravings in July- made her antsy.  Stopped shortly after  Wellbutrin:       Was on for depression, worked well,  Stopped in April and noticed this was when she was at her lowest weight.   Metformin:       No DM, Pre-DM   Contrave:        Not covered  Qsymia:           Worked but plateaued and wanted other options          194.4    WEIGHT METRICS:  Body mass index is 32.28 kg/m .   Current Weight: 194 lb (88 kg) (PATIENT REPORTED)  Last Visits Weight: 184 lb (83.5 kg)  Initial Weight (lbs): 211 lbs  Cumulative weight loss (lbs): 17  Weight Loss Percentage: 8.06%    Wt Readings from Last 10 Encounters:   10/25/23 194 lb (88 kg)   10/09/23 198 lb (89.8 kg)   09/13/23 185 lb (83.9 kg)   07/18/23 184 lb (83.5 kg)   07/10/23 182 lb (82.6 kg)   04/11/23 182 lb 8 oz (82.8 kg)   02/14/23 187 lb (84.8 kg)   01/12/23 186 lb 6.4 oz (84.6 kg)   12/12/22 191 lb 6.4 oz (86.8 kg)   10/25/22 195 lb (88.5 kg)            10/19/2023     1:40 PM   Weight Loss Medication History Reviewed With Patient   Which weight loss medications are you currently taking on a regular basis? Qysmia (phentermine/topiramate)   Are you having any side effects from the weight loss medication that we have prescribed you? Yes   If you are having side effects please describe: Sleep issues           10/19/2023     1:40 PM   Changes and Difficulties   I have made the following changes to my diet since my last visit: None   With regards to my  diet, I am still struggling with: After 5 and sweets   I have made the following changes to my activity/exercise since my last visit: Increased walking   With regards to my activity/exercise, I am still struggling with: N/a         MEDICATIONS:   Current Outpatient Medications   Medication Sig Dispense Refill    buPROPion (WELLBUTRIN XL) 150 MG 24 hr tablet Take 1 tablet in the morning for 2 weeks then increase to 2 tablets in the morning 42 tablet 0    buPROPion (WELLBUTRIN XL) 300 MG 24 hr tablet Take 1 tablet (300 mg) by mouth every morning 90 tablet 0    Cholecalciferol (VITAMIN D3 PO) Take 2,000 Units by mouth daily       esomeprazole (NEXIUM) 40 MG DR capsule Take 1 capsule (40 mg) by mouth 2 times daily Take 30-60 minutes before eating. 180 capsule 1    fluconazole (DIFLUCAN) 150 MG tablet Take 1 tablet (150 mg) by mouth every 3 days 2 tablet 3    LUTEIN PO Take 1 tablet by mouth daily      mometasone (NASONEX) 50 MCG/ACT nasal spray Spray 2 sprays into both nostrils daily      multivitamin w/minerals (THERA-VIT-M) tablet Take 1 tablet by mouth daily      predniSONE (DELTASONE) 10 MG tablet Take 10 mg by mouth      Semaglutide-Weight Management (WEGOVY) 0.25 MG/0.5ML pen Inject 0.25 mg Subcutaneous once a week 2 mL 0    Semaglutide-Weight Management (WEGOVY) 0.5 MG/0.5ML pen Inject 0.5 mg Subcutaneous once a week 2 mL 0    Semaglutide-Weight Management (WEGOVY) 1 MG/0.5ML pen Inject 1 mg Subcutaneous once a week 2 mL 0    topiramate (TOPAMAX) 25 MG tablet Take 25 mg daily week 1, increase to 50 mg daily week 2, then increase to 75 mg daily week 3 and beyond 90 tablet 0    topiramate (TOPAMAX) 25 MG tablet Take 3 tablets (75 mg) by mouth daily 270 tablet 0    VITAMIN E NATURAL PO Take by mouth daily       levothyroxine (SYNTHROID/LEVOTHROID) 112 MCG tablet TK 1 T PO QD         LABS:  Sodium   Date Value Ref Range Status   09/13/2023 142 136 - 145 mmol/L Final   11/11/2013 138 133 - 144 mmol/L Final      Potassium   Date Value Ref Range Status   09/13/2023 4.1 3.4 - 5.3 mmol/L Final   11/11/2013 3.7 3.4 - 5.3 mmol/L Final     Chloride   Date Value Ref Range Status   09/13/2023 104 98 - 107 mmol/L Final   11/11/2013 103 94 - 109 mmol/L Final     Carbon Dioxide   Date Value Ref Range Status   11/11/2013 30 20 - 32 mmol/L Final     Carbon Dioxide (CO2)   Date Value Ref Range Status   09/13/2023 27 22 - 29 mmol/L Final     Anion Gap   Date Value Ref Range Status   09/13/2023 11 7 - 15 mmol/L Final   11/11/2013 5 (L) 6 - 17 mmol/L Final     Glucose   Date Value Ref Range Status   09/13/2023 93 70 - 99 mg/dL Final   11/13/2013 123 (H) 60 - 99 mg/dL Final     Urea Nitrogen   Date Value Ref Range Status   09/13/2023 12.5 6.0 - 20.0 mg/dL Final   11/11/2013 13 5 - 24 mg/dL Final     Creatinine   Date Value Ref Range Status   09/13/2023 0.61 0.51 - 0.95 mg/dL Final   11/11/2013 0.64 0.52 - 1.04 mg/dL Final     GFR Estimate   Date Value Ref Range Status   09/13/2023 >90 >60 mL/min/1.73m2 Final   11/11/2013 >90 >60 mL/min/1.7m2 Final     Calcium   Date Value Ref Range Status   09/13/2023 9.2 8.6 - 10.0 mg/dL Final   11/11/2013 8.8 8.5 - 10.4 mg/dL Final     WBC   Date Value Ref Range Status   11/12/2013 12.3 (H) 4.0 - 11.0 10e9/L Final     WBC Count   Date Value Ref Range Status   09/13/2023 9.4 4.0 - 11.0 10e3/uL Final     Hemoglobin   Date Value Ref Range Status   09/13/2023 13.4 11.7 - 15.7 g/dL Final   11/13/2013 11.1 (L) 11.7 - 15.7 g/dL Final     Hematocrit   Date Value Ref Range Status   09/13/2023 39.9 35.0 - 47.0 % Final   11/12/2013 33.2 (L) 35.0 - 47.0 % Final     MCV   Date Value Ref Range Status   09/13/2023 95 78 - 100 fL Final   11/12/2013 93 78 - 100 fl Final     Platelet Count   Date Value Ref Range Status   09/13/2023 256 150 - 450 10e3/uL Final   11/14/2013 173 150 - 450 10e9/L Final         BP Readings from Last 6 Encounters:   10/09/23 128/82   09/13/23 128/84   09/13/23 138/66   06/08/21 124/80  "  02/08/21 130/78   02/01/21 116/64       Pulse Readings from Last 6 Encounters:   09/13/23 80   09/13/23 74   07/26/22 86   02/01/21 78   09/28/20 84   09/25/19 84       PE:  Ht 5' 5\" (1.651 m)   Wt 194 lb (88 kg)   BMI 32.28 kg/m    GENERAL: Healthy, alert and no distress  EYES: Eyes grossly normal to inspection.  No discharge or erythema, or obvious scleral/conjunctival abnormalities.  RESP: No audible wheeze, cough, or visible cyanosis.  No visible retractions or increased work of breathing.    SKIN: Visible skin clear. No significant rash, abnormal pigmentation or lesions.  NEURO: Cranial nerves grossly intact.  Mentation and speech appropriate for age.  PSYCH: Mentation appears normal, affect normal/bright, judgement and insight intact, normal speech and appearance well-groomed.      Sincerely,      Carmela Bronson PA-C        "

## 2023-10-17 PROBLEM — Z12.4 SCREENING FOR CERVICAL CANCER: Status: ACTIVE | Noted: 2023-10-17

## 2023-10-26 ENCOUNTER — VIRTUAL VISIT (OUTPATIENT)
Dept: SURGERY | Facility: CLINIC | Age: 58
End: 2023-10-26
Payer: COMMERCIAL

## 2023-10-26 VITALS — BODY MASS INDEX: 32.32 KG/M2 | HEIGHT: 65 IN | WEIGHT: 194 LBS

## 2023-10-26 DIAGNOSIS — E66.811 CLASS 1 OBESITY DUE TO EXCESS CALORIES WITHOUT SERIOUS COMORBIDITY WITH BODY MASS INDEX (BMI) OF 31.0 TO 31.9 IN ADULT: Primary | ICD-10-CM

## 2023-10-26 DIAGNOSIS — E66.09 CLASS 1 OBESITY DUE TO EXCESS CALORIES WITHOUT SERIOUS COMORBIDITY WITH BODY MASS INDEX (BMI) OF 31.0 TO 31.9 IN ADULT: Primary | ICD-10-CM

## 2023-10-26 PROCEDURE — 99214 OFFICE O/P EST MOD 30 MIN: CPT | Mod: VID | Performed by: PHYSICIAN ASSISTANT

## 2023-10-26 RX ORDER — TOPIRAMATE 25 MG/1
75 TABLET, FILM COATED ORAL DAILY
Qty: 270 TABLET | Refills: 0 | Status: SHIPPED | OUTPATIENT
Start: 2023-10-26 | End: 2024-03-28

## 2023-10-26 RX ORDER — BUPROPION HYDROCHLORIDE 300 MG/1
300 TABLET ORAL EVERY MORNING
Qty: 90 TABLET | Refills: 0 | Status: SHIPPED | OUTPATIENT
Start: 2023-10-26 | End: 2024-03-28

## 2023-10-26 RX ORDER — TOPIRAMATE 25 MG/1
TABLET, FILM COATED ORAL
Qty: 90 TABLET | Refills: 0 | Status: SHIPPED | OUTPATIENT
Start: 2023-10-26 | End: 2024-03-28

## 2023-10-26 RX ORDER — BUPROPION HYDROCHLORIDE 150 MG/1
TABLET ORAL
Qty: 42 TABLET | Refills: 0 | Status: SHIPPED | OUTPATIENT
Start: 2023-10-26 | End: 2024-03-28

## 2023-10-26 NOTE — ASSESSMENT & PLAN NOTE
" Healthy habits to assist with further weight loss were discussed. Linda will discontinue qsymia since it is causing trouble with her sleep and subsequent concentration at work.  She will restart Wellbutrin.  When she went off weight started to creep up. Start Topiramate.  I ordered Wegovy   Once able to obtain the 0.25 mg,0.5 mg , and 1 mg dose \"12 weeks of therapy\"  she can begin treatment.  She will continue to walk for exercise and eat a balanced diet.    "

## 2023-10-26 NOTE — PATIENT INSTRUCTIONS
"To ensure quality you may receive a patient satisfaction survey. The greatest compliment you can give is \"Likely to Recommend.\"    Nice to talk with you today.  Thank you for your trust. Below is the plan discussed.-  LIZZ Casey      Plan:  Stop Qsymia    Start Topiramate (Remember to drink plenty of fluids daily)   Week 1:Take 1 tablet (25 mg) at bedtime  Week 2 Take 2 tablets (50 mg) at bedtime  Week 3:Take 3 tablets (75 mg) at bedtime Stay at 3 tabs until you are seen again.     Restart Wellbutrin 150 mg in the morning for 2 weeks, then increase to 300 mg.      I ordered Wegovy to your pharmacy.  You may need to be persistent and patient to get these initial dosages due to the shortage.  Once you are able to obtain the 0.25 mg,0.5 mg , and 1 mg dose \"12 weeks of therapy\" contact clinic via [employer] you can begin treatment.      FOLLOW-UP:  Please call [phone] to schedule your next visit in 3 months.       "

## 2023-12-07 ENCOUNTER — TELEPHONE (OUTPATIENT)
Dept: SURGERY | Facility: CLINIC | Age: 58
End: 2023-12-07
Payer: COMMERCIAL

## 2023-12-07 NOTE — LETTER
"2023      Linda Mina   65  [address]  [place] [facility] [address]    To:   [employer] ACCESS Insurance Company  Policy #: [id number]     To Whom It May Concern,    I am writing on behalf of my patient, Linda Mina to document the medical necessity of Zepbound  for the treatment of Obesity. This letter provides information about the patient's medical history and diagnosis and a statement summarizing my treatment rationale.     Summary of Patient History and Diagnosis  Linda Mina is a 58 year old female with a diagnosis of obesity (BMI at least 30 kg/m2). Patient has been working with [facility] with Carmela Bronson PA-C and dietitian.      Estimated body mass index is 32.28 kg/m  as calculated from the following:    Height as of an earlier encounter on 23 date: 5'5\" height.    Weight as of an earlier encounter on 23 date: 198.6 weight.    Treatment Rationale  Patient has tried and failed the following non-pharmacological options and/or medications to achieve successful weight loss:  diet and lifestyle changes for 19 months  22  - present 23.    The patient was followed by dietitian and provider during this time.     Patient cannot use the following medications due to contraindications or use would produce potential patient harm: Qsymia: Side effects: trouble with sleep and now concentration at work   Wegovy was prescribed 23 and has not been able to get since.  Saxenda was prescribed _______________    Patient should receive Prior Authorization approval for Zepbound  because of the above information and patient qualifies for use of weight loss medication(s) because said patient has a BMI of at least 30 kg/m^2 and has no contradictions such as personal or family history of medullary thyroid carcinoma (MTC) or multiple endocrine neoplasia syndrome type 2 (MEN 2 for use of Zepbound.      Obesity is a " chronic disease that worsens over time. Weight loss does not cure obesity - instead, it triggers adaptations that restore the lost weight. [ii] It is estimated that 85% of self-directed weight loss efforts are met by weight regain. Pharmacotherapy has been shown to be both safe and effective. It doubles to triples the odds of losing 5-10% body weight (or more), and also the odds of keeping that weight off. Unfortunately, if treatment is stopped, weight is regained. [iii]    It is the position of the Obesity Medicine Association ([place]) that:  Long-term pharmacotherapy represents one important evidence-based treatment strategy for obesity.  The current standard of care with respect to obesity pharmacotherapy is that medications should be prescribed long-term.   Short-term use of obesity pharmacotherapy is not recommended as it has not been proven to provide a benefit    Duration  12 months    Summary  In summary, Zepbound is medically necessary for this patient s medical condition. Please call my office at [phone] if I can provide you with any additional information to approve my request. I look forward to receiving your timely response and approval of this request.     Sincerely,    Carmela LEE [facility]      PENNY/lexy

## 2023-12-07 NOTE — TELEPHONE ENCOUNTER
PRIOR AUTHORIZATION DENIED    Medication: TIRZEPATIDE-WEIGHT MANAGEMENT 2.5 MG/0.5ML SC SOAJ  Insurance Company: [employer] - Phone [phone] Fax [phone]  Denial Date: 12/7/2023  Denial Reason(s):     Appeal Information:     Patient Notified: No

## 2023-12-08 DIAGNOSIS — E66.09 CLASS 1 OBESITY DUE TO EXCESS CALORIES WITHOUT SERIOUS COMORBIDITY WITH BODY MASS INDEX (BMI) OF 31.0 TO 31.9 IN ADULT: Primary | ICD-10-CM

## 2023-12-08 DIAGNOSIS — E66.811 CLASS 1 OBESITY DUE TO EXCESS CALORIES WITHOUT SERIOUS COMORBIDITY WITH BODY MASS INDEX (BMI) OF 31.0 TO 31.9 IN ADULT: Primary | ICD-10-CM

## 2023-12-08 DIAGNOSIS — E66.09 CLASS 1 OBESITY DUE TO EXCESS CALORIES WITHOUT SERIOUS COMORBIDITY WITH BODY MASS INDEX (BMI) OF 32.0 TO 32.9 IN ADULT: ICD-10-CM

## 2023-12-08 DIAGNOSIS — E66.811 CLASS 1 OBESITY DUE TO EXCESS CALORIES WITHOUT SERIOUS COMORBIDITY WITH BODY MASS INDEX (BMI) OF 32.0 TO 32.9 IN ADULT: ICD-10-CM

## 2023-12-11 NOTE — TELEPHONE ENCOUNTER
"Appeal Mounjaro:       [facility] [place]  [address]  [address]  [facility] [address]  Phone: [phone]  Fax: [phone]               2023        Linda Mina   65  [address] [place] [address]     To:   [employer] ACCESS Insurance Company  Policy #: [id number]      To Whom It May Concern,     I am writing on behalf of my patient, Linda Mina to document the medical necessity of Zepbound  for the treatment of Obesity. This letter provides information about the patient's medical history and diagnosis and a statement summarizing my treatment rationale.      Summary of Patient History and Diagnosis  Linda Mina is a 58 year old female with a diagnosis of obesity (BMI at least 30 kg/m2). Patient has been working with [facility] with Carmela Bronson PA-C and dietitian.       Estimated body mass index is 32.28 kg/m  as calculated from the following:    Height as of an earlier encounter on 23 date: 5'5\" height.    Weight as of an earlier encounter on 23 date: 198.6 weight.     Treatment Rationale  Patient has tried and failed the following non-pharmacological options and/or medications to achieve successful weight loss:  diet and lifestyle changes for 19 months  22  - present 23.    The patient was followed by dietitian and provider during this time.      Patient cannot use the following medications due to contraindications or use would produce potential patient harm: Qsymia: Side effects: trouble with sleep and now concentration at work   Wegovy was prescribed 23 and has not been able to get since then luis to national shortage.  Saxenda was prescribed 23 and per pharmacy is unable to get due to national shortage.      Patient should receive Prior Authorization approval for Zepbound  because of the above information and patient qualifies for use " of weight loss medication(s) because said patient has a BMI of at least 30 kg/m^2 and has no contradictions such as personal or family history of medullary thyroid carcinoma (MTC) or multiple endocrine neoplasia syndrome type 2 (MEN 2 for use of Zepbound.       Obesity is a chronic disease that worsens over time. Weight loss does not cure obesity - instead, it triggers adaptations that restore the lost weight. [ii] It is estimated that 85% of self-directed weight loss efforts are met by weight regain. Pharmacotherapy has been shown to be both safe and effective. It doubles to triples the odds of losing 5-10% body weight (or more), and also the odds of keeping that weight off. Unfortunately, if treatment is stopped, weight is regained. [iii]    It is the position of the Obesity Medicine Association ([place]) that:  Long-term pharmacotherapy represents one important evidence-based treatment strategy for obesity.  The current standard of care with respect to obesity pharmacotherapy is that medications should be prescribed long-term.   Short-term use of obesity pharmacotherapy is not recommended as it has not been proven to provide a benefit     Duration  12 months     Summary  In summary, Zepbound is medically necessary for this patient s medical condition. Please call my office at [phone] if I can provide you with any additional information to approve my request. I look forward to receiving your timely response and approval of this request.      Sincerely,     Carmela LEE [facility]        PENNY/lexy

## 2023-12-13 NOTE — TELEPHONE ENCOUNTER
Medication Appeal Initiation    Medication: TIRZEPATIDE-WEIGHT MANAGEMENT 2.5 MG/0.5ML SC SOAJ  Appeal Start Date:  12/13/2023  Insurance Company: [employer] - Phone [phone] Fax [phone]   Insurance Phone:   Insurance Fax: [phone]  Comments:

## 2023-12-14 ENCOUNTER — TELEPHONE (OUTPATIENT)
Dept: SURGERY | Facility: CLINIC | Age: 58
End: 2023-12-14
Payer: COMMERCIAL

## 2023-12-14 NOTE — TELEPHONE ENCOUNTER
Reason for Call:  Other prescription    Detailed comments: Prashanth Bueno/ELISSA appeals dept calling regarding the patient. Caller needs to clarify some information for the patient.    Phone Number Patient can be reached at: Other phone number:  [phone] Ext [phone]    Best Time: any    Can we leave a detailed message on this number? YES    Call taken on 12/14/2023 at 4:47 PM by Dinorah Saini

## 2023-12-14 NOTE — TELEPHONE ENCOUNTER
Returned call and spoke with Prashanth.  He noted that Qsymia was tried and not tolerated and Maritza Zambrano are on national shortage  Wanted to know if and why had not trid Orlistat or why Zepbound better.  Informed that Orlistat has bad SE even if used properly and Zepbound has shown to provide much better weight loss and even has anti-emetic proprieties for better tolerance  Prashanth will take this to appeals and get back to clinic in next couple of days.  Tori Turner, MS, RD, RN

## 2023-12-19 NOTE — TELEPHONE ENCOUNTER
MEDICATION APPEAL APPROVED    Medication: TIRZEPATIDE-WEIGHT MANAGEMENT 2.5 MG/0.5ML SC SOAJ  Authorization Effective Date: 12/18/2023  Authorization Expiration Date: 12/18/2024  Approved Dose/Quantity:   Reference #:     Appeal Insurance Company: [employer] - Phone [phone] Fax [phone]   Expected CoPay: $       CoPay Card Available:    Financial Assistance Needed:   [id number] [place]  [address]    Comments:

## 2023-12-23 ENCOUNTER — TELEPHONE (OUTPATIENT)
Dept: SURGERY | Facility: CLINIC | Age: 58
End: 2023-12-23
Payer: COMMERCIAL

## 2024-01-19 NOTE — PROGRESS NOTES
Linda is a 58 year old who is being evaluated via a billable video visit.      How would you like to obtain your AVS? MyChart  If the video visit is dropped, the invitation should be resent by: Text to cell phone: [phone]  Will anyone else be joining your video visit? No          Video-Visit Details    Type of service:  Video Visit     Originating Location (pt. Location): Home    Distant Location (provider location):  on-site  Platform used for Video Visit: Kwaga      MEDICAL WEIGHT LOSS FOLLOW UP    Reason for visit: medical weight loss     DIAGNOSIS:  Class I Obesity    ANTHROPOMETRICS:  Initial Weight: 215 pounds  Previous Weight: 182 pounds  Current Weight:  187 lbs 9.6 oz   BMI: 31.22 kg/m2    Weight Loss Medications:   Zepbound  Wellbutrin    NUTRITION HISTORY:  Breakfast: [wakes at 4:30-6am, eats at 8-9am] yogurt + fruit + Kind granola  avocado toast rarely  Lunch: [skips or 11:30-12:30pm] salad (greens, mixed vegetables, quinoa, pumpkin seed, olive oil/balsamic) or soup  Dinner: [skips or 7-8pm] chicken or pork + vegetables  Snacks: none  Beverage choices: water (100oz+), coffee (8oz), wine (rare)    Dining Out:  How often do you dine out: rare  What types of food do you order: laura      Exercise:  Type: walking  Duration: 15-20  Frequency: 3x/week    Additional Information: Pt pleased with Zepbound results thus far. She is struggling with eating consistent meals r/t early satiety. Reviewed nutrition needs and discussed small, frequent meals. Reviewed the role of strength training in metabolism.      EVALUATION/PROGRESS TOWARDS GOALS:  Previous Goals:   (Remote)    Previous Nutrition Diagnosis:  Obese class I related to excess energy intake and self-monitoring deficit as evidenced by BMI of 30.29 kg/m2.  No change, modified below     Current Nutrition Diagnosis:   Obese class I related to excess energy intake and self-monitoring deficit as evidenced by BMI of 32.22  kg/m2.    INTERVENTION:    Nutrition Prescription:  Recommend modified nutrient intake by decreasing energy intake.    Implementation:    Nutrition Education (Content):  Discussed previous goals and determined new goals  Encourage physical activity  Supported patient in attempted weight loss and behavior changes  Congratulated patient on successful weight loss   Patient verbalizes understanding of diet by stating she will eat small, frequent meals  Anticipate good compliance    Goals:  Have small, frequent meals throughout the day  Strength train 2-3x/week    Follow Up/Monitoring:  Other  -  patient to follow up in 3-6 months    Time Spent With Patient:  12 Minutes       Liv Soliman RD, LD  Clinical Dietitian

## 2024-01-22 ENCOUNTER — VIRTUAL VISIT (OUTPATIENT)
Dept: SURGERY | Facility: CLINIC | Age: 59
End: 2024-01-22
Payer: COMMERCIAL

## 2024-01-22 VITALS — WEIGHT: 187.6 LBS | HEIGHT: 65 IN | BODY MASS INDEX: 31.25 KG/M2

## 2024-01-22 DIAGNOSIS — E66.09 CLASS 1 OBESITY DUE TO EXCESS CALORIES WITHOUT SERIOUS COMORBIDITY WITH BODY MASS INDEX (BMI) OF 31.0 TO 31.9 IN ADULT: Primary | ICD-10-CM

## 2024-01-22 DIAGNOSIS — E66.811 CLASS 1 OBESITY DUE TO EXCESS CALORIES WITHOUT SERIOUS COMORBIDITY WITH BODY MASS INDEX (BMI) OF 31.0 TO 31.9 IN ADULT: Primary | ICD-10-CM

## 2024-01-22 PROCEDURE — 97803 MED NUTRITION INDIV SUBSEQ: CPT | Mod: 95

## 2024-01-22 NOTE — PATIENT INSTRUCTIONS
Finesse Mckeon!      It was great chatting with you again today! Here's a summary of the goals we discussed:    1. Strength train regularly  - This is one of the biggest things we can do to support a fast metabolism  - Aim for 2-3x/week    2. Have small, frequent meals throughout the day  - See below for a sample day  - As discussed, the intention here is to better meet your body's nutrient needs (60-90g protein, and 25-35g fiber)          Plan on following up in 3-6 months. This can be scheduled via our call center at . Reach out sooner with any questions or concerns. Have a great day!      Liv Soliman RD, LD  Clinical Dietitian       Small, Frequent Meals     Aim for 2-3 food groups every 3-4 hours     Example:     Mornin eggs + a slice of whole grain toast     Mid-morning: yogurt + berries     Lunch: tuna packet + crackers + baby carrots     Mid-afternoon: low-fat cheese stick + deli meat (roll-up) + sliced bell peppers     Dinner: meat + vegetable + starch     Evenin/2c cottage cheese or 1 cup of low-fat milk

## 2024-03-21 NOTE — PROGRESS NOTES
"Linda is a 58 year old who is being evaluated via a billable video visit.      The patient has been notified of following:     \"This video visit will be conducted via a call between you and your physician/provider. We have found that certain health care needs can be provided without the need for an in-person physical exam.  This service lets us provide the care you need with a video conversation.  If a prescription is necessary we can send it directly to your pharmacy.  If lab work is needed we can place an order for that and you can then stop by our lab to have the test done at a later time.    Video visits are billed at different rates depending on your insurance coverage.  Please reach out to your insurance provider with any questions.    If during the course of the call the physician/provider feels a video visit is not appropriate, you will not be charged for this service.\"    Patient has given verbal consent for Video visit? Yes    How would you like to obtain your AVS? MyChart    If the video visit is dropped, the invitation should be resent by: Text to cell phone: [phone]    Will anyone else be joining your video visit? No    I    Video-Visit Details    Type of service:  Video Visit    Originating Location (pt. Location): Home    Distant Location (provider location):   Off-Site - Provider Home Office    Platform used for Video Visit: [employer]    Video Start Time: 8:07 AM    Video End Time:8:25 AM    3/28/2024      Return Medical Weight Management Note     Linda Mina  MRN:  [id number]  :  1965    Assessment & Plan   Problem List Items Addressed This Visit       Dry mouth     Due to Zepbound.  Discussed using Biotene mouth wash at night.          Overweight     Patient was congratulated on wt loss success thus far. Healthy habits to assist with further weight loss were discussed. Linda will continue Zepbound 5 mg weekly. She will add in strength training with her own body weight and continue " eating 3 small protein rich meals daily. She will follow up with the dietician and myself in August.            Relevant Medications    levothyroxine (SYNTHROID/LEVOTHROID) 88 MCG tablet    tirzepatide-Weight Management (ZEPBOUND) 5 MG/0.5ML prefilled pen    buPROPion (WELLBUTRIN XL) 300 MG 24 hr tablet    History of obesity - Primary       WEIGHT METRICS:  Body mass index is 29.42 kg/m .   Current Weight: 176 lb 12.8 oz (80.2 kg) (pt stated she will tell tabitha in the morning)  Last Visits Weight: 194 lb (88 kg)  Initial Weight (lbs): 211 lbs  Initial BMI: 35.11  Cumulative weight loss (lbs): 34.2  Weight Loss Percentage: 16.21%         Relevant Medications    tirzepatide-Weight Management (ZEPBOUND) 5 MG/0.5ML prefilled pen    buPROPion (WELLBUTRIN XL) 300 MG 24 hr tablet          PATIENT INSTRUCTIONS:  Continue Zepbound 5 mg weekly.   Try Biotene mouthwash at night before bed    Goals:  Add in strength training with your own body weight     Follow up:    Call [phone] to schedule next visit in 6 months with diet and Tabitha Bronson PA-C    28 minutes spent on the date of the encounter doing chart review, history and exam, result review, counseling, developing plan of care, documentation, and further activities as noted      INTERVAL HISTORY:  Linda returns for medical weight management follow up.  Last seen on 12-6-2023.  Started on Zepbound and restarted Wellbutrin/Topiramate. Appetite has shrunk.  Is full much sooner.  Still eats heathy foods the portions.       Goals:   She will continue to walk for exercise and eat a balanced diet.      WEIGHT METRICS:  Body mass index is 29.42 kg/m .   Current Weight: 176 lb 12.8 oz (80.2 kg) (pt stated she will tell tabitha in the morning)  Last Visits Weight: 194 lb (88 kg)  Initial Weight (lbs): 211 lbs  Cumulative weight loss (lbs): 34.2  Weight Loss Percentage: 16.21%    Wt Readings from Last 10 Encounters:   03/27/24 176 lb 12.8 oz (80.2 kg)   01/22/24 187 lb  "9.6 oz (85.1 kg)   10/25/23 194 lb (88 kg)   10/09/23 198 lb (89.8 kg)   09/13/23 185 lb (83.9 kg)   07/18/23 184 lb (83.5 kg)   07/10/23 182 lb (82.6 kg)   04/11/23 182 lb 8 oz (82.8 kg)   02/14/23 187 lb (84.8 kg)   01/12/23 186 lb 6.4 oz (84.6 kg)                 3/25/2024    10:29 AM   Weight Loss Medication History Reviewed With Patient   Which weight loss medications are you currently taking on a regular basis? Bupropion   Are you having any side effects from the weight loss medication that we have prescribed you? Yes   If you are having side effects please describe: Dry mouth           3/25/2024    10:29 AM   Changes and Difficulties   I have made the following changes to my diet since my last visit: I eat less   I have made the following changes to my activity/exercise since my last visit: Increased walking   With regards to my activity/exercise, I am still struggling with: Introducing strength training       LABS:  Reviewed in Epic    BP Readings from Last 6 Encounters:   10/09/23 128/82   09/13/23 128/84   09/13/23 138/66   06/08/21 124/80   02/08/21 130/78   02/01/21 116/64       Pulse Readings from Last 6 Encounters:   09/13/23 80   09/13/23 74   07/26/22 86   02/01/21 78   09/28/20 84   09/25/19 84       PE:  Ht 5' 5\" (1.651 m)   Wt 176 lb 12.8 oz (80.2 kg)   BMI 29.42 kg/m    GENERAL: Healthy, alert and no distress  RESP: No audible wheeze, cough, or visible cyanosis.  No visible retractions or increased work of breathing.    SKIN: Visible skin clear. No significant rash, abnormal pigmentation or lesions.  PSYCH: Mentation appears normal, affect normal/bright, judgement and insight intact              "

## 2024-03-27 RX ORDER — LEVOTHYROXINE SODIUM 88 UG/1
88 TABLET ORAL
COMMUNITY
Start: 2023-10-04

## 2024-03-28 ENCOUNTER — VIRTUAL VISIT (OUTPATIENT)
Dept: SURGERY | Facility: CLINIC | Age: 59
End: 2024-03-28
Payer: COMMERCIAL

## 2024-03-28 VITALS — HEIGHT: 65 IN | BODY MASS INDEX: 29.46 KG/M2 | WEIGHT: 176.8 LBS

## 2024-03-28 DIAGNOSIS — Z86.39 HISTORY OF OBESITY: Primary | ICD-10-CM

## 2024-03-28 DIAGNOSIS — R68.2 DRY MOUTH: ICD-10-CM

## 2024-03-28 DIAGNOSIS — E66.3 OVERWEIGHT: ICD-10-CM

## 2024-03-28 PROCEDURE — 99213 OFFICE O/P EST LOW 20 MIN: CPT | Mod: 95 | Performed by: PHYSICIAN ASSISTANT

## 2024-03-28 RX ORDER — BUPROPION HYDROCHLORIDE 300 MG/1
300 TABLET ORAL EVERY MORNING
Qty: 90 TABLET | Refills: 1 | Status: SHIPPED | OUTPATIENT
Start: 2024-03-28 | End: 2024-08-01

## 2024-03-28 NOTE — ASSESSMENT & PLAN NOTE
Patient was congratulated on wt loss success thus far. Healthy habits to assist with further weight loss were discussed. Linda will continue Zepbound 5 mg weekly. She will add in strength training with her own body weight and continue eating 3 small protein rich meals daily. She will follow up with the dietician and myself in August.

## 2024-03-28 NOTE — PATIENT INSTRUCTIONS
Nice to talk with you today. Below is the plan discussed.-  LIZZ Casey      Pt Instructions:  Continue Zepbound 5 mg weekly.   Try Biotene mouthwash at night before bed    Goals:  Add in strength training with your own body weight     Follow up:    Call [phone] to schedule next visit in 6 months with diet and Carmela Bronson PA-C

## 2024-03-28 NOTE — ASSESSMENT & PLAN NOTE
WEIGHT METRICS:  Body mass index is 29.42 kg/m .   Current Weight: 176 lb 12.8 oz (80.2 kg) (pt stated she will tell tabitha in the morning)  Last Visits Weight: 194 lb (88 kg)  Initial Weight (lbs): 211 lbs  Initial BMI: 35.11  Cumulative weight loss (lbs): 34.2  Weight Loss Percentage: 16.21%

## 2024-03-29 DIAGNOSIS — Z86.39 HISTORY OF OBESITY: ICD-10-CM

## 2024-03-29 DIAGNOSIS — E66.3 OVERWEIGHT: ICD-10-CM

## 2024-04-01 NOTE — TELEPHONE ENCOUNTER
Zepbound refilled 3/28/24 6 ml and 1 RF.  Will refuse per clinic protocol.  Tori Turner, MS, RD, RN

## 2024-04-02 ENCOUNTER — TELEPHONE (OUTPATIENT)
Dept: SURGERY | Facility: CLINIC | Age: 59
End: 2024-04-02
Payer: COMMERCIAL

## 2024-04-02 NOTE — TELEPHONE ENCOUNTER
Prior Authorization Retail Medication Request    Medication/Dose: Semaglutide-Weight Management (WEGOVY) 1 MG/0.5ML pen  Diagnosis and ICD code (if different than what is on RX):  E66.3   New/renewal/insurance change PA/secondary ins. PA:  Previously Tried and Failed:diet and lifestyle changes from 05/31/22 to present, Qsymia had trouble with side effects, Patient was previously prescribed Wegovy and was unable to get it due to shortages, Patient was also prescribed Saxenda and was unable to get it due to shortages.  Patient had started on Zepbound and titrated to 5mg weekly but due to current shortages patient is unable to obtain her medication. Patient's weight was 194 lbs before starting any GLP-1 therapy 10/26/23. Patient's weight was 176.8 lbs at her most recent visit on 03/28/24.    Rationale:  weight management    Insurance   Primary: [employer] OPEN ACCESS   Insurance ID:  [id number]     Pharmacy Information (if different than what is on RX)  Name:  [id number] - [place], [place] - [address]   Phone:  [phone]   Fax: [phone]

## 2024-04-03 RX ORDER — TIRZEPATIDE 5 MG/.5ML
INJECTION, SOLUTION SUBCUTANEOUS
Refills: 2 | OUTPATIENT
Start: 2024-04-03

## 2024-04-14 NOTE — TELEPHONE ENCOUNTER
Retail Pharmacy Prior Authorization Team   Phone: [phone]    PA Initiation    Medication: WEGOVY 1 MG/0.5ML SC SOAJ  Insurance Company: [employer] - Phone [phone] Fax [phone]  [id number] [place] - [address]  Filling Pharmacy Phone: [phone]  Filling Pharmacy Fax: [phone]  Start Date: 4/14/2024

## 2024-04-17 NOTE — TELEPHONE ENCOUNTER
Prior Authorization Approval    Medication: WEGOVY 1 MG/0.5ML SC SOAJ  Authorization Effective Date: 4/15/2024  Authorization Expiration Date: 4/15/2025  Approved Dose/Quantity:   Reference #:     Insurance Company: [employer] [phone] Fax [phone]  Expected CoPay: $    CoPay Card Available:      Financial Assistance Needed:   Which [facility] is [facility] the [id number] [place], [place] - [address]  Pharmacy Notified: Yes  Patient Notified:

## 2024-04-19 ENCOUNTER — MYC MEDICAL ADVICE (OUTPATIENT)
Dept: SURGERY | Facility: CLINIC | Age: 59
End: 2024-04-19
Payer: COMMERCIAL

## 2024-04-19 DIAGNOSIS — Z86.39 HISTORY OF OBESITY: ICD-10-CM

## 2024-04-19 DIAGNOSIS — E66.3 OVERWEIGHT: ICD-10-CM

## 2024-06-12 ENCOUNTER — MYC MEDICAL ADVICE (OUTPATIENT)
Dept: SURGERY | Facility: CLINIC | Age: 59
End: 2024-06-12
Payer: COMMERCIAL

## 2024-06-12 DIAGNOSIS — Z86.39 HISTORY OF OBESITY: ICD-10-CM

## 2024-06-12 DIAGNOSIS — E66.3 OVERWEIGHT: ICD-10-CM

## 2024-06-12 DIAGNOSIS — E66.3 OVERWEIGHT: Primary | ICD-10-CM

## 2024-06-12 NOTE — TELEPHONE ENCOUNTER
Pt last seen 3/28 and next 8/1/24.  Switched to Wegovy 1.7 from Zepbound 5 mg on 4/19/24.  Is wondering if should go up in dose to Wegovy 2.4 or change back to Zepbound.  Still losing wt on the 1.7 but has slowed.  Plz advise and I can order.  Pt aware you are back tomorrow.  Debra Turner, MS, RD, RN

## 2024-06-14 RX ORDER — SEMAGLUTIDE 1.7 MG/.75ML
1.7 INJECTION, SOLUTION SUBCUTANEOUS
Qty: 3 ML | Refills: 0 | Status: SHIPPED | OUTPATIENT
Start: 2024-06-14 | End: 2024-08-01

## 2024-06-14 NOTE — TELEPHONE ENCOUNTER
If her hunger and cravings are controlled and continues to lose 1 to 2 pounds a week I would stay at the 1.7 mg otherwise increase to the 2.4 mg.  If needed we can give a prescription for 2.4 mg until we see her again.

## 2024-06-14 NOTE — TELEPHONE ENCOUNTER
Pt last seen 3/28 and next 8/1/24.  Switched to Wegovy 1.7 from Zepbound 5 mg on 4/19/24.  Is wondering if should go up in dose to Wegovy 2.4 or change back to Zepbound.  Still losing wt on the 1.7 but has slowed.  Refill for 1.7 or 2.4?  Plz advise - thx Tori Turner, MS, RD, RN

## 2024-07-07 ENCOUNTER — MYC MEDICAL ADVICE (OUTPATIENT)
Dept: SURGERY | Facility: CLINIC | Age: 59
End: 2024-07-07
Payer: COMMERCIAL

## 2024-07-07 DIAGNOSIS — E66.3 OVERWEIGHT: ICD-10-CM

## 2024-07-07 DIAGNOSIS — Z86.39 HISTORY OF OBESITY: ICD-10-CM

## 2024-07-08 NOTE — TELEPHONE ENCOUNTER
She was on 5 mg before.  If hunger and potions were controlled at this dose I would stay here. If not we could consider increase.

## 2024-07-16 NOTE — PROGRESS NOTES
"Linda is a 59 year old who is being evaluated via a billable video visit.      The patient has been notified of following:     \"This video visit will be conducted via a call between you and your physician/provider. We have found that certain health care needs can be provided without the need for an in-person physical exam.  This service lets us provide the care you need with a video conversation.  If a prescription is necessary we can send it directly to your pharmacy.  If lab work is needed we can place an order for that and you can then stop by our lab to have the test done at a later time.    Video visits are billed at different rates depending on your insurance coverage.  Please reach out to your insurance provider with any questions.    If during the course of the call the physician/provider feels a video visit is not appropriate, you will not be charged for this service.\"    Patient has given verbal consent for Video visit? Yes    How would you like to obtain your AVS? MyChart    If the video visit is dropped, the invitation should be resent by: Text to cell phone: [phone]    Will anyone else be joining your video visit? No    I    Video-Visit Details    Type of service:  Video Visit    Originating Location (pt. Location): Home    Distant Location (provider location):   Off-Site - Provider Home Office    Platform used for Video Visit: [email or web address]    Video Start Time:  8:28    Video End Time: 8:49      MEDICAL WEIGHT LOSS FOLLOW UP  Patient accompanied by: self      DIAGNOSIS:  Class: Overweight    NUTRITION HISTORY:    Breakfast: whole grain toast (4 grams fiber) peanut butter or avocado, yogurt, berries, granola     Lunch:  leftover from dinner (protein, veggies or salad) or fruit     Dinner:  fish, chicken or beef or turkey, baby potatoes, or rice, veggie     Snacks:  rare or fruit     Beverage Choices:  `100 oz water, 1 cup coffee, occasional tea, ETOH-rare (1 beer)     Eating Behaviors: denies     Dining " Out: rare     EXERCISE:  Type: walking/ strength training  Frequency: 6-7/3 days per week  Duration: /20 minutes    ADDITIONAL INFORMATION:  Last RD visit was January 2024. Pt  lost her job in June and is more focused on her health. Denies side affect from Wegovy. Has veggie garden and is eating more produce.      ANTHROPOMETRICS:    Initial Weight: 215 pounds    Previous Weight:  187 pounds    Current Weight:  161 pounds patient reported    Weight Change:  26 pounds decrease/ overall weight loss of 54 lb     BMI: 26.79 kg/m2    MEDICATION FOR WEIGHT LOSS:  Wegovy  Wellbutrin    Supplements:  1 multivitamin 55 plus  1 vitamin D  Hair/ skin/ nail supplement      EVALUATION/PROGRESS TOWARDS GOALS:  Previous Goals:  Have small, frequent meals throughout the day-not met  Strength train 2-3x/week-met      Previous Nutrition Diagnosis:  Obese class I related to overeating and poor lifestyle habits as evidence by patient's subjective statements and  BMI of 31.22 kg/m2     Current Nutrition Diagnosis:   Overweight related to overeating and poor lifestyle habits as evidence by patient's subjective statements and  BMI of 26.79 kg/m2  No change      INTERVENTION:    Nutrition Prescription:  Recommend modified nutrient intake by decreasing energy intake    Implementation:    Meals and Snacks: 3    Nutrition Education (Content):  Discussed previous goals and determined new goals  Encourage physical activity  Supported patient in attempted weight loss and behavior changes   Congratulated patient on successful weight loss   Patient verbalizes understanding of weight management by  wanting to remain active most days of the week  Anticipate good compliance    Goals:  Include 3 different food groups at meals  Continue with current exercise routine    Follow Up/Monitoring:  Other  -  patient to follow up in 4-6 months    Time Spent With Patient:  20 Minutes    Von Dale RD, ALANA  [facility],  [place]

## 2024-07-22 ENCOUNTER — VIRTUAL VISIT (OUTPATIENT)
Dept: SURGERY | Facility: CLINIC | Age: 59
End: 2024-07-22
Payer: COMMERCIAL

## 2024-07-22 VITALS — BODY MASS INDEX: 26.79 KG/M2 | WEIGHT: 161 LBS

## 2024-07-22 DIAGNOSIS — E66.3 OVERWEIGHT: Primary | ICD-10-CM

## 2024-07-22 PROCEDURE — 97803 MED NUTRITION INDIV SUBSEQ: CPT | Mod: 95

## 2024-07-22 NOTE — PROGRESS NOTES
"Linda is a 59 year old who is being evaluated via a billable video visit.      The patient has been notified of following:     \"This video visit will be conducted via a call between you and your physician/provider. We have found that certain health care needs can be provided without the need for an in-person physical exam.  This service lets us provide the care you need with a video conversation.  If a prescription is necessary we can send it directly to your pharmacy.  If lab work is needed we can place an order for that and you can then stop by our lab to have the test done at a later time.    Video visits are billed at different rates depending on your insurance coverage.  Please reach out to your insurance provider with any questions.    If during the course of the call the physician/provider feels a video visit is not appropriate, you will not be charged for this service.\"    Patient has given verbal consent for Video visit? Yes    How would you like to obtain your AVS? MyChart    If the video visit is dropped, the invitation should be resent by:My Chart    Will anyone else be joining your video visit? No    I    Video-Visit Details    Type of service:  Video Visit    Originating Location (pt. Location): Home    Distant Location (provider location):   [facility]    Platform used for Video Visit: [employer]    Video Start Time: 8:01 AM    Video End Time:8:24 AM    2024      Return Medical Weight Management Note     Linda Mina  MRN:  [id number]  :  1965    Assessment & Plan   Problem List Items Addressed This Visit       Overweight - Primary     Patient was congratulated on wt loss success thus far. Healthy habits to assist with further weight loss were discussed. Linda will continue Wegovy 2.4 mg weekly until supply finished.  Will then switch over to 5 mg weekly of zepbound 5 mg weekly.  If tolerating can go up to 7.5 mg weekly following     Goals continue " strength training with her own body weight and continue eating 3 small protein rich meals daily.   She will follow up with the dietician and myself in December and dietician in 6 mo.         Relevant Medications    tirzepatide-Weight Management (ZEPBOUND) 5 MG/0.5ML prefilled pen    tirzepatide-Weight Management (ZEPBOUND) 7.5 MG/0.5ML prefilled pen    buPROPion (WELLBUTRIN XL) 300 MG 24 hr tablet    History of obesity     Initial Weight (lbs): 211 lbs  Initial BMI: 35.11  Body mass index is 26.13 kg/m .   Current Weight: 157 lb (71.2 kg)  Last Visits Weight: 176 lb (79.8 kg) lbs  Cumulative weight loss (lbs): 54  Weight Loss Percentage: 25.59%         Relevant Medications    tirzepatide-Weight Management (ZEPBOUND) 5 MG/0.5ML prefilled pen    tirzepatide-Weight Management (ZEPBOUND) 7.5 MG/0.5ML prefilled pen    buPROPion (WELLBUTRIN XL) 300 MG 24 hr tablet    Lower abdominal pain     Lipase and CMP ordered         Relevant Orders    Comprehensive metabolic panel    Lipase      AOM Considerations:8/1/2024    Phentermine:   Tolerated Qsymia- phentermine was not covered    Topiramate:     Tolerated Qsymia, Was off for a month when trying to find Wegovy. After restarting has had trouble with sleep and now concentration at work.   GLP-1:            Doing well on Wegovy except for at higher doses she has abdominal pain.  Will start Zepbound  Naltrexone:      Started for cravings in July- made her antsy.  Stopped shortly after  Wellbutrin:       Was on for depression, worked well,  Stopped in April and noticed this was when she was at her lowest weight.   Metformin:       No DM, Pre-DM   Contrave:        Not covered  Qsymia:           Worked but plateaued and wanted other options       PATIENT INSTRUCTIONS:  Pt Instructions:  Continue Wellbutrin  Continue Wegovy until supply gone  Switch to Zepbound 5 mg weekly if tolerated  Increase to 7.5 mg weekly (otherwise can stay at 5 mg weekly  Have labs drawn at primary care  [facility] when complete    Goals:  She will add in strength training with her own body weight and continue eating 3 small protein rich meals daily.     Follow up:  Call [phone] to schedule next visit in She will follow up with the dietician and myself in August.     31 minutes spent on the date of the encounter doing chart review, history and exam, result review, counseling, developing plan of care, documentation, and further activities as noted      INTERVAL HISTORY:  Linda returns for medical weight management follow up. Last seen 3/28/24.    On Wegovy and restarted Wellbutrin/Topiramate. Appetite has shrunk.  Is full much sooner. On 5 mg zepbound. Has insuracne for 3 more months or Wegovy 2.4 mg and then has 3 months of Zepbound.  Goal weight 145 lbs.      WEIGHT METRICS:  Body mass index is 26.13 kg/m .   Current Weight: 157 lb (71.2 kg)  Last Visits Weight: 176 lb (79.8 kg)  Initial Weight (lbs): 211 lbs  Cumulative weight loss (lbs): 54  Weight Loss Percentage: 25.59%    Wt Readings from Last 10 Encounters:   08/01/24 157 lb (71.2 kg)   07/22/24 161 lb (73 kg)   03/27/24 176 lb 12.8 oz (80.2 kg)   01/22/24 187 lb 9.6 oz (85.1 kg)   10/25/23 194 lb (88 kg)   10/09/23 198 lb (89.8 kg)   09/13/23 185 lb (83.9 kg)   07/18/23 184 lb (83.5 kg)   07/10/23 182 lb (82.6 kg)   04/11/23 182 lb 8 oz (82.8 kg)                 7/26/2024     2:03 PM   Weight Loss Medication History Reviewed With Patient   Which weight loss medications are you currently taking on a regular basis? Wegovy    Bupropion   Are you having any side effects from the weight loss medication that we have prescribed you? No   Is having a rupali of staomch pain on the 2.4 gm dose for a few have days after.          7/26/2024     2:03 PM   Changes and Difficulties   I have made the following changes to my diet since my last visit: None   With regards to my diet, I am still struggling with: N/A   I have made the following changes to my  "activity/exercise since my last visit: Increased walking and free weights   With regards to my activity/exercise, I am still struggling with: N/A       LABS:  Reviewed in Epic    BP Readings from Last 6 Encounters:   10/09/23 128/82   09/13/23 128/84   09/13/23 138/66   06/08/21 124/80   02/08/21 130/78   02/01/21 116/64       Pulse Readings from Last 6 Encounters:   09/13/23 80   09/13/23 74   07/26/22 86   02/01/21 78   09/28/20 84   09/25/19 84       PE:  Ht 5' 5\" (1.651 m)   Wt 157 lb (71.2 kg)   BMI 26.13 kg/m    GENERAL: Healthy, alert and no distress  RESP: No audible wheeze, cough, or visible cyanosis.  No visible retractions or increased work of breathing.    SKIN: Visible skin clear. No significant rash, abnormal pigmentation or lesions.  PSYCH: Mentation appears normal, affect normal/bright, judgement and insight intact        "

## 2024-07-22 NOTE — PATIENT INSTRUCTIONS
Finesse Mckeon-   It was great to visit with you and learn about your progress. Below are the goals we discussed.  Goals:  Include 3 different food groups at meals  Continue with current exercise routine  Please call [phone] to schedule your next visit with a Dietitian in 4-6 months.  Thanks!  Von Dale RD, LD  [facility], [place]

## 2024-08-01 ENCOUNTER — VIRTUAL VISIT (OUTPATIENT)
Dept: SURGERY | Facility: CLINIC | Age: 59
End: 2024-08-01
Payer: COMMERCIAL

## 2024-08-01 VITALS — HEIGHT: 65 IN | WEIGHT: 157 LBS | BODY MASS INDEX: 26.16 KG/M2

## 2024-08-01 DIAGNOSIS — E66.3 OVERWEIGHT: Primary | ICD-10-CM

## 2024-08-01 DIAGNOSIS — Z86.39 HISTORY OF OBESITY: ICD-10-CM

## 2024-08-01 DIAGNOSIS — R10.30 LOWER ABDOMINAL PAIN: ICD-10-CM

## 2024-08-01 PROCEDURE — 99214 OFFICE O/P EST MOD 30 MIN: CPT | Mod: 95 | Performed by: PHYSICIAN ASSISTANT

## 2024-08-01 RX ORDER — BUPROPION HYDROCHLORIDE 300 MG/1
300 TABLET ORAL EVERY MORNING
Qty: 90 TABLET | Refills: 1 | Status: SHIPPED | OUTPATIENT
Start: 2024-08-01

## 2024-08-01 NOTE — ASSESSMENT & PLAN NOTE
Initial Weight (lbs): 211 lbs  Initial BMI: 35.11  Body mass index is 26.13 kg/m .   Current Weight: 157 lb (71.2 kg)  Last Visits Weight: 176 lb (79.8 kg) lbs  Cumulative weight loss (lbs): 54  Weight Loss Percentage: 25.59%

## 2024-08-01 NOTE — ASSESSMENT & PLAN NOTE
Patient was congratulated on wt loss success thus far. Healthy habits to assist with further weight loss were discussed. Linda will continue Wegovy 2.4 mg weekly until supply finished.  Will then switch over to 5 mg weekly of zepbound 5 mg weekly.  If tolerating can go up to 7.5 mg weekly following     Goals continue strength training with her own body weight and continue eating 3 small protein rich meals daily.   She will follow up with the dietician and myself in December and dietician in 6 mo.

## 2024-08-01 NOTE — PATIENT INSTRUCTIONS
Nice to talk with you today. Below is the plan discussed.-  LIZZ Casey      Pt Instructions:  Continue Wellbutrin  Continue Wegovy until supply gone  Switch to Zepbound 5 mg weekly if tolerated  Increase to 7.5 mg weekly (otherwise can stay at 5 mg weekly  [facility]    Goals:  She will add in strength training with her own body weight and continue eating 3 small protein rich meals daily.     Follow up:  Call [phone] to schedule next visit in She will follow up with the dietician and myself in August.

## 2024-12-18 ENCOUNTER — VIRTUAL VISIT (OUTPATIENT)
Dept: SURGERY | Facility: CLINIC | Age: 59
End: 2024-12-18
Payer: COMMERCIAL

## 2024-12-18 VITALS — BODY MASS INDEX: 23.46 KG/M2 | HEIGHT: 65 IN | WEIGHT: 140.8 LBS

## 2024-12-18 DIAGNOSIS — E66.3 OVERWEIGHT: ICD-10-CM

## 2024-12-18 DIAGNOSIS — Z86.39 HISTORY OF OBESITY: Primary | ICD-10-CM

## 2024-12-18 PROCEDURE — 99213 OFFICE O/P EST LOW 20 MIN: CPT | Mod: 95 | Performed by: PHYSICIAN ASSISTANT

## 2024-12-18 PROCEDURE — G2211 COMPLEX E/M VISIT ADD ON: HCPCS | Mod: 95 | Performed by: PHYSICIAN ASSISTANT

## 2024-12-18 RX ORDER — BUPROPION HYDROCHLORIDE 300 MG/1
300 TABLET ORAL EVERY MORNING
Qty: 90 TABLET | Refills: 1 | Status: SHIPPED | OUTPATIENT
Start: 2024-12-18

## 2024-12-18 NOTE — PROGRESS NOTES
"Linda is a 59 year old who is being evaluated via a billable video visit.      The patient has been notified of following:     \"This video visit will be conducted via a call between you and your physician/provider. We have found that certain health care needs can be provided without the need for an in-person physical exam.  This service lets us provide the care you need with a video conversation.  If a prescription is necessary we can send it directly to your pharmacy.  If lab work is needed we can place an order for that and you can then stop by our lab to have the test done at a later time.    Video visits are billed at different rates depending on your insurance coverage.  Please reach out to your insurance provider with any questions.    If during the course of the call the physician/provider feels a video visit is not appropriate, you will not be charged for this service.\"    Patient has given verbal consent for Video visit? Yes    How would you like to obtain your AVS? MyChart    If the video visit is dropped, the invitation should be resent by: My Chart    Will anyone else be joining your video visit? No    I    Video-Visit Details    Type of service:  Video Visit    Originating Location (pt. Location): Parked car in [place]     Distant Location (provider location):   [facility] in [place]    Platform used for Video Visit: [employer]    Video Start Time: 2:40 pm    Video End Time:3:01 PM      2024      Return Medical Weight Management Note     Linda Mina  MRN:  [id number]  :  1965    Assessment & Plan   Problem List Items Addressed This Visit       Overweight    Relevant Medications    tirzepatide-Weight Management (ZEPBOUND) 5 MG/0.5ML prefilled pen    buPROPion (WELLBUTRIN XL) 300 MG 24 hr tablet    History of obesity - Primary     Patient was congratulated on wt loss success thus far. Reviewed recommendations for muscle conservation. Pivoting to weight maintenance - still losing at 7.5 mg Zepbound so " will reduce to 5 mg for maintenance. Sleep clinic recommending adding Pepcid or switching to Pronotix - reviewed options of adding the H2 blocker pepcid vs inc PPI to protonix - she'll start w/adding OTC pepcid as has good supply of Nexium still on hand and be in touch on [email or web address] if wanting to pivot to protonix. Continue Wellbutrin. No new labs needed. Reviewed hair loss likely related to weight loss - handout provided. Offered screening CBC, iron studies, TSH - declines for now.           Relevant Medications    tirzepatide-Weight Management (ZEPBOUND) 5 MG/0.5ML prefilled pen    buPROPion (WELLBUTRIN XL) 300 MG 24 hr tablet        AOM Considerations:8/1/2024 per Carmela Bronson PA-C      Phentermine:   Tolerated Qsymia- phentermine was not covered    Topiramate:     Tolerated Qsymia, Was off for a month when trying to find Wegovy. After restarting has had trouble with sleep and now concentration at work.   GLP-1:            Doing well on Wegovy except for at higher doses she has abdominal pain.  Will start Zepbound  Naltrexone:      Started for cravings in July- made her antsy.  Stopped shortly after  Wellbutrin:       Was on for depression, worked well,  Stopped in April and noticed this was when she was at her lowest weight.   Metformin:       No DM, Pre-DM   Contrave:        Not covered  Qsymia:           Worked but plateaued and wanted other options     PATIENT INSTRUCTIONS:  Pt Instructions:  Go down to 5 mg Zepbound weekly.  Continue Wellbutrin.   You can add Pepcid (famotidine) 20 mg once to twice daily with the esomeprazole. If helpful can continue with that combo or send us a [employer] message to pivot to Protonix and stop the esomeprazole.     Goals:  Continue to focus on healthy food choices - prioritizing protein and veggies in your meals. I recommend eating every 4-6 hours to reduce the risk of low blood sugars and try to minimize snacking between meals. Stay well hydrated though the day as well.  Great  job with exercising - please continue to invest in yourself with regular exercise. Continue to strive for a range for 150-300 minutes of exercise for weight maintenance and incorporating strength training twice a week to help preserve muscle!      Follow up:    Call [phone] to schedule next visit in next available. Be in touch on Mychart for refills/concerns between visits    27 minutes spent on the date of the encounter doing chart review, history and exam, result review, counseling, developing plan of care, documentation, and further activities as noted      INTERVAL HISTORY:  Linda returns for medical weight management follow up.  Last seen on 8/1/24 with Carmela Bronson PA-C and plan to switch from Wegovy to Zepbound. Scheduled w/me due to last minute cancellation with Carmela Bronson PA-C.    Currently she is at the 7.5 mg Zepbound dose for a few months. Reports is still losing at this dose and feels could go back to the 5 mg dose for better weight maintenance as is now in the normal weight BMI around 23.     Noting hair changes as well with weight changes. Reports getting good protein intake and eating on a regular basis.  Reports no significant hair changes since this summer. Discussed likely a response to weight loss - recommend PCP eval if significant concerns otherwise should improve as weight stabilizes. Reviewed normal CBC/TSH this summer - offered repeat with iron studies for further eval and dedlines.     Also noting that her sleep medicine doctors wanted her to be on a stronger medication for reflux. Reviewed their note discussing adding pepcid or increasing to protonix (currently on nexium).    Esomeprazole 40 mg from Carmela Bronson PA-C. She reports reflux is something that was from prior medications.     Also reports is taking Crestor for plaque in one of her arteries.    Reports insurance may have changing coverage in 2025 but is aware of compounding option with [facility] and may consider if  "not having future coverage.     WEIGHT METRICS:  Body mass index is 23.43 kg/m .   Current Weight: 140 lb 12.8 oz (63.9 kg)  Last Visits Weight: 157 lb (71.2 kg)  Initial Weight (lbs): 211 lbs  Cumulative weight loss (lbs): 70.2  Weight Loss Percentage: 33.27%    Wt Readings from Last 10 Encounters:   12/18/24 140 lb 12.8 oz (63.9 kg)   08/01/24 157 lb (71.2 kg)   07/22/24 161 lb (73 kg)   03/27/24 176 lb 12.8 oz (80.2 kg)   01/22/24 187 lb 9.6 oz (85.1 kg)   10/25/23 194 lb (88 kg)   10/09/23 198 lb (89.8 kg)   09/13/23 185 lb (83.9 kg)   07/18/23 184 lb (83.5 kg)   07/10/23 182 lb (82.6 kg)                 7/26/2024     2:03 PM   Weight Loss Medication History Reviewed With Patient   Which weight loss medications are you currently taking on a regular basis? Wegovy    Bupropion   Are you having any side effects from the weight loss medication that we have prescribed you? No           7/26/2024     2:03 PM   Changes and Difficulties   I have made the following changes to my diet since my last visit: None   With regards to my diet, I am still struggling with: N/A   I have made the following changes to my activity/exercise since my last visit: Increased walking and free weights   With regards to my activity/exercise, I am still struggling with: N/A     Still doing - even in the cold.     LABS:  Labs reviewed in Epic    8/12/24 TSH WNL  CBC WNL  CMP WNL    BP Readings from Last 6 Encounters:   10/09/23 128/82   09/13/23 128/84   09/13/23 138/66   06/08/21 124/80   02/08/21 130/78   02/01/21 116/64       Pulse Readings from Last 6 Encounters:   09/13/23 80   09/13/23 74   07/26/22 86   02/01/21 78   09/28/20 84   09/25/19 84       PE:  Ht 5' 5\" (1.651 m)   Wt 140 lb 12.8 oz (63.9 kg)   BMI 23.43 kg/m    GENERAL: Healthy, alert and no distress  EYES: Eyes grossly normal to inspection.    RESP: No audible wheeze, cough, or visible cyanosis.  No increased work of breathing.    SKIN: Visible skin clear. No significant " rash, abnormal pigmentation or lesions.  NEURO: Mentation and speech appropriate for age.  PSYCH: Mentation appears normal, affect normal/bright, judgement and insight intact, normal speech and appearance well-groomed.      Sincerely,      Yohana Hoyos PA-C

## 2024-12-18 NOTE — ASSESSMENT & PLAN NOTE
Patient was congratulated on wt loss success thus far. Reviewed recommendations for muscle conservation. Pivoting to weight maintenance - still losing at 7.5 mg Zepbound so will reduce to 5 mg for maintenance. Sleep clinic recommending adding Pepcid or switching to Pronotix - reviewed options of adding the H2 blocker pepcid vs inc PPI to protonix - she'll start w/adding OTC pepcid as has good supply of Nexium still on hand and be in touch on MyChart if wanting to pivot to protonix. Continue Wellbutrin. No new labs needed. Reviewed hair loss likely related to weight loss - handout provided. Offered screening CBC, iron studies, TSH - declines for now.

## 2024-12-18 NOTE — PATIENT INSTRUCTIONS
Nice to talk with you today. Below is the plan discussed.-  Yohana Hoyos PA-C     Pt Instructions:  Go down to 5 mg Zepbound weekly.  Continue Wellbutrin.   You can add Pepcid (famotidine) 20 mg once to twice daily with the esomeprazole. If helpful can continue with that combo or send us a [employer] message to pivot to Protonix and stop the esomeprazole.     Goals:  Continue to focus on healthy food choices - prioritizing protein and veggies in your meals. I recommend eating every 4-6 hours to reduce the risk of low blood sugars and try to minimize snacking between meals. Stay well hydrated though the day as well.  Great job with exercising - please continue to invest in yourself with regular exercise. Continue to strive for a range for 150-300 minutes of exercise for weight maintenance and incorporating strength training twice a week to help preserve muscle!      Follow up:    Call [phone] to schedule next visit in next available. Be in touch on [address] for refills/concerns between visits      Telogen Effluvium (Hair Loss)  What Is It?  At any given time, about 85% to 90% of the hairs on the average person's head are actively growing (the anagen phase) and the others are resting (the telogen phase). Typically, a hair is in the anagen phase for two to four years, then enters the telogen phase, rests for about two to four months, and then falls out and is replaced by a new, growing hair. The average person naturally loses about 100 hairs a day.  In a person with telogen effluvium, some body change or shock pushes more hairs into the telogen phase. About 30% of the hairs stop growing and go into the resting phase before falling out. So you may lose an average of 300 hairs a day instead of 100.  Telogen effluvium can be triggered by a number of different events, including:  Surgery (like wt. loss surgery)  Extreme weight loss  Extreme change in diet  Abrupt hormonal changes, including those associated with  childbirth and menopause  Iron deficiency  Hypothyroidism or hyperthyroidism  Because hairs that enter the telogen phase rest in place for two to four months before falling out, you may not notice any hair loss until two to four months after the event that caused the problem. Telogen effluvium rarely lasts longer than six months.  Although losing a great number of hairs within a short time can be frightening, the condition is  temporary. Each hair pushed out is replaced by a new, growing hair. Because hair on the scalp grows slowly, your hair may feel or look thinner than usual for a time but fullness will return as the new hairs grow in.  Expected Duration  Typically, hair loss begins two to four months after the event that triggered the problem, and lasts approximately six months. New hairs begin growing immediately after the hair falls out, but significant growth may not be noticed for several months.  Prevention  Nothing can be done to prevent most of the types of physical shock that can start telogen effluvium.   Treatment  No treatment for active telogen effluvium has been proven effective.

## 2024-12-23 ENCOUNTER — TELEPHONE (OUTPATIENT)
Dept: SURGERY | Facility: CLINIC | Age: 59
End: 2024-12-23
Payer: COMMERCIAL

## 2024-12-23 NOTE — TELEPHONE ENCOUNTER
"Prior Authorization Retail Medication Request    Medication/Dose: Zepbound 5 mg    ICD code (if different than what is on RX):    History of obesity [Z86.39]  - Primary      Overweight [E66.3]          Previously Tried and Failed:  diet and lifestyle and Qsymia (phentermine ant topiramate)    Rationale:    Phentermine:   Tolerated Qsymia- but plateaued   phentermine was not covered    Topiramate:     Tolerated Qsymia, Was off for a month when trying to find [employer]. After restarting has had trouble with sleep and now concentration at work.   Wegovy = abdominal pain at higher doses.  Naltrexone: Started for cravings in July- made her antsy. Stopped shortly after     Hx of obesity   Ht: 5'5\"  Body mass index is 23.43 kg/m . 12/18/24  Current Weight:  140.8 lb (63.9 kg) 12/18/24  Last Visits Weight: 157 lb (71.2 kg) 8/1/24     Initial Weight (lbs): 194 lbs Date: 10/26/23  Initial BMI: 32.28 Date: 10/26/23  Cumulative weight loss (lbs): 53.2  Weight Loss Percentage: 27.3%    Insurance Name:      [employer] OPEN ACCESS     Insurance ID:  [id number]       Pharmacy Information (if different than what is on RX)  Name:  [id number] - [place], [place] - [address]   Phone:  [phone]     "

## 2024-12-24 NOTE — TELEPHONE ENCOUNTER
Zepbound 5 mg EPA approved  Authorized from December 19, 2024 to December 19, 2025  Information received electronically from payer.  Pt notified via  msg.  Tori Turner MS, RD, RN

## 2025-06-20 PROBLEM — Z86.39 HISTORY OF OBESITY: Status: ACTIVE | Noted: 2022-05-31

## 2025-07-19 DIAGNOSIS — E66.3 OVERWEIGHT: ICD-10-CM

## 2025-07-19 DIAGNOSIS — Z86.39 HISTORY OF OBESITY: ICD-10-CM

## 2025-07-21 RX ORDER — BUPROPION HYDROCHLORIDE 300 MG/1
300 TABLET ORAL EVERY MORNING
Qty: 30 TABLET | Refills: 0 | Status: SHIPPED | OUTPATIENT
Start: 2025-07-21

## 2025-07-21 NOTE — TELEPHONE ENCOUNTER
GENESIS Muller PA-C:  I'm following Carmela's inbox.      Good to refill till her September visit. Reviewed vitals from outside clinic   Blood Pressure 110/68 04/21/2025 9:39 AM CDT     Pulse 54       Pt has 30+ caps left will provide 30 to get to next visit.  Tori Turner, MS, RD, RN

## 2025-07-21 NOTE — TELEPHONE ENCOUNTER
Per pt phone msg today.  Pt is taking and has 30 + left as of today.  Will route to provider to get back when provider back in clinic .  Tori Turner, MS, RD, RN

## 2025-07-21 NOTE — TELEPHONE ENCOUNTER
Pt sent msg.  Not seen in [facility] last visit 6/20/25.  Perhaps pt wants to resume.  Tori Turner MS, RD, RN

## 2025-08-20 DIAGNOSIS — E66.3 OVERWEIGHT: ICD-10-CM

## 2025-08-20 DIAGNOSIS — Z86.39 HISTORY OF OBESITY: ICD-10-CM

## 2025-08-20 RX ORDER — BUPROPION HYDROCHLORIDE 300 MG/1
300 TABLET ORAL EVERY MORNING
Qty: 30 TABLET | Refills: 0 | Status: SHIPPED | OUTPATIENT
Start: 2025-08-20

## (undated) RX ORDER — ONDANSETRON 2 MG/ML
INJECTION INTRAMUSCULAR; INTRAVENOUS
Status: DISPENSED
Start: 2020-09-28

## (undated) RX ORDER — FENTANYL CITRATE 50 UG/ML
INJECTION, SOLUTION INTRAMUSCULAR; INTRAVENOUS
Status: DISPENSED
Start: 2020-09-28